# Patient Record
Sex: FEMALE | URBAN - METROPOLITAN AREA
[De-identification: names, ages, dates, MRNs, and addresses within clinical notes are randomized per-mention and may not be internally consistent; named-entity substitution may affect disease eponyms.]

---

## 2020-09-17 ENCOUNTER — APPOINTMENT (OUTPATIENT)
Dept: URBAN - METROPOLITAN AREA CLINIC 254 | Age: 75
Setting detail: DERMATOLOGY
End: 2020-09-21

## 2020-09-17 ENCOUNTER — APPOINTMENT (OUTPATIENT)
Dept: URBAN - METROPOLITAN AREA CLINIC 254 | Age: 75
Setting detail: DERMATOLOGY
End: 2020-09-17

## 2020-09-17 VITALS — WEIGHT: 200 LBS | RESPIRATION RATE: 14 BRPM | HEIGHT: 63.5 IN

## 2020-09-17 DIAGNOSIS — L21.8 OTHER SEBORRHEIC DERMATITIS: ICD-10-CM

## 2020-09-17 DIAGNOSIS — D17 BENIGN LIPOMATOUS NEOPLASM: ICD-10-CM

## 2020-09-17 PROBLEM — D17.0 BENIGN LIPOMATOUS NEOPLASM OF SKIN AND SUBCUTANEOUS TISSUE OF HEAD, FACE AND NECK: Status: ACTIVE | Noted: 2020-09-17

## 2020-09-17 PROCEDURE — OTHER ADDITIONAL NOTES: OTHER

## 2020-09-17 PROCEDURE — OTHER COUNSELING: OTHER

## 2020-09-17 PROCEDURE — 99202 OFFICE O/P NEW SF 15 MIN: CPT

## 2020-09-17 PROCEDURE — OTHER PRESCRIPTION: OTHER

## 2020-09-17 RX ORDER — TRIAMCINOLONE ACETONIDE 0.25 MG/G
0.025% CREAM TOPICAL BID PRN
Qty: 1 | Refills: 0 | Status: ERX | COMMUNITY
Start: 2020-09-17

## 2020-09-17 ASSESSMENT — LOCATION SIMPLE DESCRIPTION DERM
LOCATION SIMPLE: LEFT FOREHEAD
LOCATION SIMPLE: POSTERIOR SCALP

## 2020-09-17 ASSESSMENT — LOCATION ZONE DERM
LOCATION ZONE: SCALP
LOCATION ZONE: FACE

## 2020-09-17 ASSESSMENT — LOCATION DETAILED DESCRIPTION DERM
LOCATION DETAILED: LEFT SUPERIOR FOREHEAD
LOCATION DETAILED: POSTERIOR MID-PARIETAL SCALP

## 2020-09-17 NOTE — PROCEDURE: ADDITIONAL NOTES
Detail Level: Simple
Additional Notes: Patient states lesion has been growing and enlarging. KL will refer patient to Dr. Hunter for excision of lipoma. Wrote down excision and closure codes for patient to check insurance coverage
Additional Notes: Currently has been trying to manage using OTC hydrocortisone and dandruff shampoos. KL recommended patient try a prescription strength topical steroid. Patient agreed.

## 2023-10-14 ENCOUNTER — TELEPHONE (OUTPATIENT)
Dept: GERIATRICS | Facility: CLINIC | Age: 78
End: 2023-10-14
Payer: MEDICARE

## 2023-10-14 NOTE — TELEPHONE ENCOUNTER
Resident new admit after a CABG.  Today requesting O2 due to feeling like she had SOB.    Vitals perfect and O2 sats 94%.  Has edema, legs wrapped and on lasix and elevating them.      Nursing encouraged tramadol to comfort and finally Gauri took it.  Nursing wanting to update about all of this.    Orders:  Due to being a recent CABG patient, expect she may catch her breath.  Ok for 1-2L/min of O2 prn for comfort    DESIRAE Daniel CNP

## 2023-10-15 ENCOUNTER — APPOINTMENT (OUTPATIENT)
Dept: GENERAL RADIOLOGY | Facility: CLINIC | Age: 78
DRG: 280 | End: 2023-10-15
Attending: EMERGENCY MEDICINE
Payer: MEDICARE

## 2023-10-15 ENCOUNTER — HOSPITAL ENCOUNTER (INPATIENT)
Facility: CLINIC | Age: 78
LOS: 3 days | Discharge: NURSING, REHAB OR LONG-TERM CARE | DRG: 280 | End: 2023-10-19
Attending: EMERGENCY MEDICINE | Admitting: INTERNAL MEDICINE
Payer: MEDICARE

## 2023-10-15 DIAGNOSIS — I97.130: ICD-10-CM

## 2023-10-15 DIAGNOSIS — R06.00 DYSPNEA, UNSPECIFIED TYPE: ICD-10-CM

## 2023-10-15 DIAGNOSIS — Z95.1 S/P CABG (CORONARY ARTERY BYPASS GRAFT): ICD-10-CM

## 2023-10-15 DIAGNOSIS — I50.9 ACUTE CONGESTIVE HEART FAILURE, UNSPECIFIED HEART FAILURE TYPE (H): ICD-10-CM

## 2023-10-15 DIAGNOSIS — I50.31 ACUTE HEART FAILURE WITH PRESERVED EJECTION FRACTION (HFPEF) (H): Primary | ICD-10-CM

## 2023-10-15 PROBLEM — S22.000A THORACIC COMPRESSION FRACTURE, CLOSED, INITIAL ENCOUNTER (H): Status: ACTIVE | Noted: 2022-10-09

## 2023-10-15 PROBLEM — M81.0 OSTEOPOROSIS WITHOUT CURRENT PATHOLOGICAL FRACTURE: Status: ACTIVE | Noted: 2023-09-25

## 2023-10-15 PROBLEM — K22.70 BARRETT'S ESOPHAGUS: Status: ACTIVE | Noted: 2019-10-25

## 2023-10-15 PROBLEM — K59.00 CONSTIPATION: Status: ACTIVE | Noted: 2022-10-13

## 2023-10-15 PROBLEM — I10 HTN (HYPERTENSION): Status: ACTIVE | Noted: 2023-08-04

## 2023-10-15 PROBLEM — R79.89 TROPONIN LEVEL ELEVATED: Status: ACTIVE | Noted: 2023-09-29
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
## 2023-10-15 PROBLEM — K58.9 IRRITABLE BOWEL SYNDROME: Status: ACTIVE | Noted: 2023-10-12

## 2023-10-15 PROBLEM — I48.91 ATRIAL FIBRILLATION WITH RAPID VENTRICULAR RESPONSE (H): Status: ACTIVE | Noted: 2023-08-04
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
## 2023-10-15 PROBLEM — I42.2 HYPERTROPHIC CARDIOMYOPATHY (H): Status: ACTIVE | Noted: 2023-08-06

## 2023-10-15 PROBLEM — S42.202D CLOSED FRACTURE OF PROXIMAL END OF LEFT HUMERUS WITH ROUTINE HEALING: Status: ACTIVE | Noted: 2020-02-23

## 2023-10-15 PROBLEM — F43.22 ADJUSTMENT DISORDER WITH ANXIETY: Status: ACTIVE | Noted: 2023-08-04

## 2023-10-15 PROBLEM — S01.01XA SCALP LACERATION, INITIAL ENCOUNTER: Status: ACTIVE | Noted: 2020-04-25

## 2023-10-15 PROBLEM — R07.9 CHEST PAIN, UNSPECIFIED TYPE: Status: ACTIVE | Noted: 2023-09-29

## 2023-10-15 LAB
ANION GAP SERPL CALCULATED.3IONS-SCNC: 14 MMOL/L (ref 7–15)
ATRIAL RATE - MUSE: 62 BPM
BASO+EOS+MONOS # BLD AUTO: ABNORMAL 10*3/UL
BASO+EOS+MONOS NFR BLD AUTO: ABNORMAL %
BASOPHILS # BLD AUTO: 0 10E3/UL (ref 0–0.2)
BASOPHILS NFR BLD AUTO: 0 %
BUN SERPL-MCNC: 6.8 MG/DL (ref 8–23)
CALCIUM SERPL-MCNC: 9.1 MG/DL (ref 8.8–10.2)
CHLORIDE SERPL-SCNC: 91 MMOL/L (ref 98–107)
CREAT SERPL-MCNC: 0.56 MG/DL (ref 0.51–0.95)
DEPRECATED HCO3 PLAS-SCNC: 30 MMOL/L (ref 22–29)
DIASTOLIC BLOOD PRESSURE - MUSE: NORMAL MMHG
EGFRCR SERPLBLD CKD-EPI 2021: >90 ML/MIN/1.73M2
EOSINOPHIL # BLD AUTO: 0.1 10E3/UL (ref 0–0.7)
EOSINOPHIL NFR BLD AUTO: 1 %
ERYTHROCYTE [DISTWIDTH] IN BLOOD BY AUTOMATED COUNT: 14.2 % (ref 10–15)
GLUCOSE SERPL-MCNC: 114 MG/DL (ref 70–99)
HCT VFR BLD AUTO: 35.9 % (ref 35–47)
HGB BLD-MCNC: 12.1 G/DL (ref 11.7–15.7)
HOLD SPECIMEN: NORMAL
HOLD SPECIMEN: NORMAL
IMM GRANULOCYTES # BLD: 0.1 10E3/UL
IMM GRANULOCYTES NFR BLD: 1 %
INTERPRETATION ECG - MUSE: NORMAL
LYMPHOCYTES # BLD AUTO: 2 10E3/UL (ref 0.8–5.3)
LYMPHOCYTES NFR BLD AUTO: 12 %
MAGNESIUM SERPL-MCNC: 1.6 MG/DL (ref 1.7–2.3)
MCH RBC QN AUTO: 31.8 PG (ref 26.5–33)
MCHC RBC AUTO-ENTMCNC: 33.7 G/DL (ref 31.5–36.5)
MCV RBC AUTO: 94 FL (ref 78–100)
MONOCYTES # BLD AUTO: 1 10E3/UL (ref 0–1.3)
MONOCYTES NFR BLD AUTO: 6 %
NEUTROPHILS # BLD AUTO: 13.5 10E3/UL (ref 1.6–8.3)
NEUTROPHILS NFR BLD AUTO: 80 %
NRBC # BLD AUTO: 0 10E3/UL
NRBC BLD AUTO-RTO: 0 /100
NT-PROBNP SERPL-MCNC: 7854 PG/ML (ref 0–1800)
P AXIS - MUSE: 22 DEGREES
PLATELET # BLD AUTO: 409 10E3/UL (ref 150–450)
POTASSIUM SERPL-SCNC: 3.8 MMOL/L (ref 3.4–5.3)
PR INTERVAL - MUSE: 144 MS
QRS DURATION - MUSE: 90 MS
QT - MUSE: 508 MS
QTC - MUSE: 515 MS
R AXIS - MUSE: 11 DEGREES
RBC # BLD AUTO: 3.81 10E6/UL (ref 3.8–5.2)
SODIUM SERPL-SCNC: 135 MMOL/L (ref 135–145)
SYSTOLIC BLOOD PRESSURE - MUSE: NORMAL MMHG
T AXIS - MUSE: 129 DEGREES
TROPONIN T SERPL HS-MCNC: 68 NG/L
TROPONIN T SERPL HS-MCNC: 71 NG/L
VENTRICULAR RATE- MUSE: 62 BPM
WBC # BLD AUTO: 16.7 10E3/UL (ref 4–11)

## 2023-10-15 PROCEDURE — 96374 THER/PROPH/DIAG INJ IV PUSH: CPT

## 2023-10-15 PROCEDURE — 83880 ASSAY OF NATRIURETIC PEPTIDE: CPT | Performed by: EMERGENCY MEDICINE

## 2023-10-15 PROCEDURE — 93005 ELECTROCARDIOGRAM TRACING: CPT

## 2023-10-15 PROCEDURE — G0378 HOSPITAL OBSERVATION PER HR: HCPCS

## 2023-10-15 PROCEDURE — 84484 ASSAY OF TROPONIN QUANT: CPT | Performed by: EMERGENCY MEDICINE

## 2023-10-15 PROCEDURE — 85025 COMPLETE CBC W/AUTO DIFF WBC: CPT | Performed by: EMERGENCY MEDICINE

## 2023-10-15 PROCEDURE — 250N000013 HC RX MED GY IP 250 OP 250 PS 637: Performed by: INTERNAL MEDICINE

## 2023-10-15 PROCEDURE — 83735 ASSAY OF MAGNESIUM: CPT | Performed by: INTERNAL MEDICINE

## 2023-10-15 PROCEDURE — 84132 ASSAY OF SERUM POTASSIUM: CPT | Performed by: INTERNAL MEDICINE

## 2023-10-15 PROCEDURE — 36415 COLL VENOUS BLD VENIPUNCTURE: CPT | Performed by: EMERGENCY MEDICINE

## 2023-10-15 PROCEDURE — 250N000011 HC RX IP 250 OP 636: Mod: JZ | Performed by: EMERGENCY MEDICINE

## 2023-10-15 PROCEDURE — 71046 X-RAY EXAM CHEST 2 VIEWS: CPT

## 2023-10-15 PROCEDURE — 99223 1ST HOSP IP/OBS HIGH 75: CPT | Mod: A1 | Performed by: INTERNAL MEDICINE

## 2023-10-15 PROCEDURE — 36415 COLL VENOUS BLD VENIPUNCTURE: CPT | Performed by: INTERNAL MEDICINE

## 2023-10-15 PROCEDURE — 80048 BASIC METABOLIC PNL TOTAL CA: CPT | Performed by: EMERGENCY MEDICINE

## 2023-10-15 PROCEDURE — 99285 EMERGENCY DEPT VISIT HI MDM: CPT | Mod: 25

## 2023-10-15 RX ORDER — ACETAMINOPHEN 500 MG
1000 TABLET ORAL EVERY 6 HOURS PRN
Status: DISCONTINUED | OUTPATIENT
Start: 2023-10-15 | End: 2023-10-19 | Stop reason: HOSPADM

## 2023-10-15 RX ORDER — ONDANSETRON 4 MG/1
4 TABLET, ORALLY DISINTEGRATING ORAL EVERY 8 HOURS PRN
COMMUNITY

## 2023-10-15 RX ORDER — OMEPRAZOLE 20 MG/1
20 TABLET, DELAYED RELEASE ORAL DAILY
COMMUNITY
End: 2023-10-30

## 2023-10-15 RX ORDER — LISINOPRIL 10 MG/1
10 TABLET ORAL DAILY
Status: DISCONTINUED | OUTPATIENT
Start: 2023-10-16 | End: 2023-10-16

## 2023-10-15 RX ORDER — FUROSEMIDE 10 MG/ML
40 INJECTION INTRAMUSCULAR; INTRAVENOUS ONCE
Status: COMPLETED | OUTPATIENT
Start: 2023-10-15 | End: 2023-10-15

## 2023-10-15 RX ORDER — VITAMIN B COMPLEX
25 TABLET ORAL DAILY
COMMUNITY

## 2023-10-15 RX ORDER — NALOXONE HYDROCHLORIDE 0.4 MG/ML
0.4 INJECTION, SOLUTION INTRAMUSCULAR; INTRAVENOUS; SUBCUTANEOUS
Status: DISCONTINUED | OUTPATIENT
Start: 2023-10-15 | End: 2023-10-19 | Stop reason: HOSPADM

## 2023-10-15 RX ORDER — ONDANSETRON 4 MG/1
4 TABLET, ORALLY DISINTEGRATING ORAL EVERY 6 HOURS PRN
Status: DISCONTINUED | OUTPATIENT
Start: 2023-10-15 | End: 2023-10-15

## 2023-10-15 RX ORDER — FUROSEMIDE 20 MG
20 TABLET ORAL DAILY
Status: ON HOLD | COMMUNITY
End: 2023-10-19

## 2023-10-15 RX ORDER — NALOXONE HYDROCHLORIDE 0.4 MG/ML
0.2 INJECTION, SOLUTION INTRAMUSCULAR; INTRAVENOUS; SUBCUTANEOUS
Status: DISCONTINUED | OUTPATIENT
Start: 2023-10-15 | End: 2023-10-19 | Stop reason: HOSPADM

## 2023-10-15 RX ORDER — TRAMADOL HYDROCHLORIDE 50 MG/1
50 TABLET ORAL EVERY 6 HOURS PRN
Status: ON HOLD | COMMUNITY
End: 2023-10-19

## 2023-10-15 RX ORDER — MULTIPLE VITAMINS W/ MINERALS TAB 9MG-400MCG
1 TAB ORAL DAILY
Status: ON HOLD | COMMUNITY
End: 2023-10-19

## 2023-10-15 RX ORDER — ATORVASTATIN CALCIUM 80 MG/1
80 TABLET, FILM COATED ORAL DAILY
COMMUNITY
End: 2023-11-20

## 2023-10-15 RX ORDER — ASPIRIN 81 MG/1
81 TABLET ORAL DAILY
COMMUNITY

## 2023-10-15 RX ORDER — AMOXICILLIN 250 MG
1 CAPSULE ORAL DAILY PRN
COMMUNITY
End: 2023-11-20

## 2023-10-15 RX ORDER — AMIODARONE HYDROCHLORIDE 200 MG/1
200 TABLET ORAL DAILY
Status: DISCONTINUED | OUTPATIENT
Start: 2023-10-16 | End: 2023-10-19 | Stop reason: HOSPADM

## 2023-10-15 RX ORDER — ONDANSETRON 2 MG/ML
4 INJECTION INTRAMUSCULAR; INTRAVENOUS EVERY 6 HOURS PRN
Status: DISCONTINUED | OUTPATIENT
Start: 2023-10-15 | End: 2023-10-15

## 2023-10-15 RX ORDER — BISACODYL 5 MG
5 TABLET, DELAYED RELEASE (ENTERIC COATED) ORAL DAILY PRN
COMMUNITY
End: 2023-11-20

## 2023-10-15 RX ORDER — TRAMADOL HYDROCHLORIDE 50 MG/1
50 TABLET ORAL EVERY 6 HOURS PRN
Status: DISCONTINUED | OUTPATIENT
Start: 2023-10-15 | End: 2023-10-19 | Stop reason: HOSPADM

## 2023-10-15 RX ORDER — AMIODARONE HYDROCHLORIDE 200 MG/1
200 TABLET ORAL DAILY
COMMUNITY
Start: 2023-10-13 | End: 2023-11-20

## 2023-10-15 RX ORDER — MAGNESIUM OXIDE 400 MG/1
400 TABLET ORAL EVERY 4 HOURS
Status: COMPLETED | OUTPATIENT
Start: 2023-10-15 | End: 2023-10-15

## 2023-10-15 RX ORDER — HYDRALAZINE HYDROCHLORIDE 20 MG/ML
10 INJECTION INTRAMUSCULAR; INTRAVENOUS EVERY 4 HOURS PRN
Status: DISCONTINUED | OUTPATIENT
Start: 2023-10-15 | End: 2023-10-19 | Stop reason: HOSPADM

## 2023-10-15 RX ORDER — POTASSIUM CHLORIDE 750 MG/1
10 TABLET, EXTENDED RELEASE ORAL DAILY
COMMUNITY
End: 2023-10-30

## 2023-10-15 RX ORDER — HYDROXYZINE HYDROCHLORIDE 25 MG/1
25 TABLET, FILM COATED ORAL EVERY 4 HOURS PRN
Status: DISCONTINUED | OUTPATIENT
Start: 2023-10-15 | End: 2023-10-19 | Stop reason: HOSPADM

## 2023-10-15 RX ORDER — LISINOPRIL 5 MG/1
5 TABLET ORAL DAILY
Status: ON HOLD | COMMUNITY
End: 2023-10-19

## 2023-10-15 RX ORDER — HYDROMORPHONE HYDROCHLORIDE 2 MG/1
1 TABLET ORAL EVERY 4 HOURS PRN
Status: ON HOLD | COMMUNITY
End: 2023-10-19

## 2023-10-15 RX ORDER — ASPIRIN 81 MG/1
81 TABLET ORAL DAILY
Status: DISCONTINUED | OUTPATIENT
Start: 2023-10-16 | End: 2023-10-19 | Stop reason: HOSPADM

## 2023-10-15 RX ORDER — ACETAMINOPHEN 500 MG
1000 TABLET ORAL EVERY 6 HOURS PRN
COMMUNITY

## 2023-10-15 RX ORDER — AMLODIPINE BESYLATE 10 MG/1
TABLET ORAL
COMMUNITY
End: 2023-10-15

## 2023-10-15 RX ORDER — HYDROXYZINE HYDROCHLORIDE 25 MG/1
25 TABLET, FILM COATED ORAL EVERY 4 HOURS PRN
Status: ON HOLD | COMMUNITY
End: 2023-10-19

## 2023-10-15 RX ORDER — METOPROLOL SUCCINATE 25 MG/1
25 TABLET, EXTENDED RELEASE ORAL DAILY
COMMUNITY
End: 2023-10-30

## 2023-10-15 RX ORDER — FAMOTIDINE 20 MG/1
20 TABLET, FILM COATED ORAL 2 TIMES DAILY
COMMUNITY
End: 2023-10-27

## 2023-10-15 RX ORDER — POLYETHYLENE GLYCOL 3350 17 G/17G
1 POWDER, FOR SOLUTION ORAL DAILY
COMMUNITY

## 2023-10-15 RX ORDER — LIDOCAINE 40 MG/G
CREAM TOPICAL
Status: DISCONTINUED | OUTPATIENT
Start: 2023-10-15 | End: 2023-10-19 | Stop reason: HOSPADM

## 2023-10-15 RX ORDER — METOPROLOL SUCCINATE 50 MG/1
50 TABLET, EXTENDED RELEASE ORAL DAILY
Status: DISCONTINUED | OUTPATIENT
Start: 2023-10-16 | End: 2023-10-19 | Stop reason: HOSPADM

## 2023-10-15 RX ORDER — PANTOPRAZOLE SODIUM 40 MG/1
40 TABLET, DELAYED RELEASE ORAL DAILY PRN
Status: DISCONTINUED | OUTPATIENT
Start: 2023-10-15 | End: 2023-10-19 | Stop reason: HOSPADM

## 2023-10-15 RX ADMIN — FUROSEMIDE 40 MG: 10 INJECTION, SOLUTION INTRAMUSCULAR; INTRAVENOUS at 14:03

## 2023-10-15 RX ADMIN — Medication 400 MG: at 22:13

## 2023-10-15 RX ADMIN — Medication 400 MG: at 18:24

## 2023-10-15 RX ADMIN — TRAMADOL HYDROCHLORIDE 50 MG: 50 TABLET, COATED ORAL at 18:51

## 2023-10-15 RX ADMIN — APIXABAN 5 MG: 5 TABLET, FILM COATED ORAL at 20:32

## 2023-10-15 ASSESSMENT — ACTIVITIES OF DAILY LIVING (ADL)
ADLS_ACUITY_SCORE: 35
ADLS_ACUITY_SCORE: 40
ADLS_ACUITY_SCORE: 35
ADLS_ACUITY_SCORE: 40
ADLS_ACUITY_SCORE: 40

## 2023-10-15 NOTE — PLAN OF CARE
Orientation/Cognitive: A/Ox4  Mobility Level/Assist Equipment: Ax1-2  Fall Risk (Y/N): yes  Behavior Concerns: none  Pain Management: has chronic back pain, prn tramadol given x1  Tele/VS/O2: tele NSR, SVT for 40 beats and reverted back to NS. MD notified. VSS on RA, HTN  ABNL Lab/BG: Trop 68, BNP 7854, K and Mg replacement. Mg 1.6  Diet: low Na diet  Bowel/Bladder: purwick in place for frequency, no BM  Skin Concerns: midline sternal incision from previous CABG, left thigh scabbing, scattered bruising   Drains/Devices: Left PIV SL  Tests/Procedures for next shift: EKG tomorrow am, SW consult pending  Anticipated DC date & active delays: tbd

## 2023-10-15 NOTE — PHARMACY-ADMISSION MEDICATION HISTORY
Pharmacist Admission Medication History    Admission medication history is complete. The information provided in this note is only as accurate as the sources available at the time of the update.    Information Source(s): Facility ([facility]/) medication list/MAR - [facility]        Changes made to PTA medication list:  Added: Most entries  Deleted: Amlodipine  Changed: None           Allergies reviewed with patient and updates made in EHR: no    Medication History Completed By: Thomas Canseco [facility] 10/15/2023 2:53 PM    PTA Med List   Medication Sig Last Dose    acetaminophen (TYLENOL) 500 MG tablet Take 1,000 mg by mouth every 6 hours as needed for mild pain  at PRN    amiodarone (PACERONE) 200 MG tablet Take 200 mg by mouth daily 10/15/2023 at AM    apixaban ANTICOAGULANT (ELIQUIS) 5 MG tablet Take 5 mg by mouth 2 times daily 10/15/2023 at 0800    aspirin 81 MG EC tablet Take 81 mg by mouth daily 10/15/2023 at 0800    atorvastatin (LIPITOR) 80 MG tablet Take 80 mg by mouth daily 10/15/2023 at 0800    bisacodyl (DULCOLAX) 5 MG EC tablet Take 5 mg by mouth daily as needed for constipation  at PRN    famotidine (PEPCID) 20 MG tablet Take 20 mg by mouth 2 times daily 10/15/2023 at 0800    furosemide (LASIX) 20 MG tablet Take 20 mg by mouth daily 10/15/2023 at 0800    HYDROmorphone (DILAUDID) 2 MG tablet Take 1 mg by mouth every 4 hours as needed for severe pain  at PRN    hydrOXYzine (ATARAX) 25 MG tablet Take 25 mg by mouth every 4 hours as needed for itching (adjuvant pain)  at PRN    lisinopril (ZESTRIL) 5 MG tablet Take 5 mg by mouth daily 10/15/2023 at 0800    metoprolol succinate ER (TOPROL XL) 25 MG 24 hr tablet Take 50 mg by mouth daily 10/15/2023 at 0800    multivitamin w/minerals (THERA-VIT-M) tablet Take 1 tablet by mouth daily 10/15/2023 at 0800    omeprazole (PRILOSEC OTC) 20 MG EC tablet Take 20 mg by mouth daily as needed (symptoms of GERD)  at PRN    ondansetron (ZOFRAN ODT) 4 MG ODT tab Take 4 mg by  mouth every 8 hours as needed for nausea  at PRN    polyethylene glycol (MIRALAX) 17 g packet Take 1 packet by mouth daily 10/15/2023 at 0800    potassium chloride ER (KLOR-CON M) 10 MEQ CR tablet Take 10 mEq by mouth daily 10/15/2023 at 0800    senna-docusate (SENOKOT-S/PERICOLACE) 8.6-50 MG tablet Take 1 tablet by mouth daily as needed for constipation (1-2) at bedtime  at PRN    traMADol (ULTRAM) 50 MG tablet Take 50 mg by mouth every 6 hours as needed for severe pain 10/15/2023 at PRN    vitamin B-Complex Take 1 tablet by mouth daily 10/15/2023 at 0800    Vitamin D3 (CHOLECALCIFEROL) 25 mcg (1000 units) tablet Take 25 mcg by mouth daily 10/15/2023 at 0800

## 2023-10-15 NOTE — ED NOTES
Pt denies chest pressure, SOB, and dizziness at rest. RN instructed pt to notify RN if symptoms return.

## 2023-10-15 NOTE — PROGRESS NOTES
RECEIVING UNIT ED HANDOFF REVIEW    ED Nurse Handoff Report was reviewed by: Malika Castañeda RN on October 15, 2023 at 4:45 PM

## 2023-10-15 NOTE — ED NOTES
Bed: ED30  Expected date:   Expected time:   Means of arrival:   Comments:  [facility] 77 F SOB chest pressure open heart surgery 1.5 weeks ago stable eta 0905

## 2023-10-15 NOTE — PROGRESS NOTES
RECEIVING UNIT ED HANDOFF REVIEW    ED Nurse Handoff Report was reviewed by: Xiomy Winters RN on October 15, 2023 at 2:49 PM

## 2023-10-15 NOTE — ED NOTES
[facility]  ED Nurse Handoff Report    ED Chief complaint: Chest Pressure, Shortness of Breath, and Dizziness      ED Diagnosis:   Final diagnoses:   Dyspnea, unspecified type   Acute congestive heart failure, unspecified heart failure type (H)   Postoperative heart failure following cardiac surgery       Code Status: To be addressed to admitting provider    Allergies: No Known Allergies    Patient Story: Patient presents s/p triple bypass 10/4 with SOB and dizziness    Focused Assessment:    XR Chest 2 Views   Final Result   IMPRESSION: Low lung volumes with small bilateral pleural effusions and associated airspace opacities. Pulmonary vascular crowding with suggestion of mild interstitial pulmonary edema.      Unchanged enlarged cardiomediastinal silhouette with sternotomy wires.      Degenerative changes in the shoulders with old fracture deformity of the left humerus.        Labs Ordered and Resulted from Time of ED Arrival to Time of ED Departure   BASIC METABOLIC PANEL - Abnormal       Result Value    Sodium 135      Potassium 3.8      Chloride 91 (*)     Carbon Dioxide (CO2) 30 (*)     Anion Gap 14      Urea Nitrogen 6.8 (*)     Creatinine 0.56      GFR Estimate >90      Calcium 9.1      Glucose 114 (*)    TROPONIN T, HIGH SENSITIVITY - Abnormal    Troponin T, High Sensitivity 71 (*)    CBC WITH PLATELETS AND DIFFERENTIAL - Abnormal    WBC Count 16.7 (*)     RBC Count 3.81      Hemoglobin 12.1      Hematocrit 35.9      MCV 94      MCH 31.8      MCHC 33.7      RDW 14.2      Platelet Count 409      % Neutrophils 80      % Lymphocytes 12      % Monocytes 6      Mids % (Monos, Eos, Basos)        % Eosinophils 1      % Basophils 0      % Immature Granulocytes 1      NRBCs per 100 WBC 0      Absolute Neutrophils 13.5 (*)     Absolute Lymphocytes 2.0      Absolute Monocytes 1.0      Mids Abs (Monos, Eos, Basos)        Absolute Eosinophils 0.1      Absolute Basophils 0.0      Absolute Immature  Granulocytes 0.1      Absolute NRBCs 0.0     NT PROBNP INPATIENT - Abnormal    N terminal Pro BNP Inpatient 7,854 (*)    TROPONIN T, HIGH SENSITIVITY - Abnormal    Troponin T, High Sensitivity 68 (*)      Treatments and/or interventions provided:   Medications   furosemide (LASIX) injection 40 mg (40 mg Intravenous $Given 10/15/23 5575)     Does this patient have any cognitive concerns?: None. Alert and oriented x4.     Activity level - Baseline/Home:  Independent  Activity Level - Current:   Unknown    Patient's Preferred language: English   Needed?: No    Isolation: None  Infection: Not Applicable  Patient tested for COVID 19 prior to admission: NO  Bariatric?: No    Vital Signs:   Vitals:    10/15/23 1330 10/15/23 1400 10/15/23 1430 10/15/23 1530   BP: (!) 169/52 (!) 174/87 (!) 170/72 (!) 155/71   Pulse: 61 62 62 57   Resp:   20 28   Temp:       TempSrc:       SpO2:   93% 94%   Weight:       Height:           Cardiac Rhythm:Cardiac Rhythm: Normal sinus rhythm    ED NURSE PHONE NUMBER: *[id number]

## 2023-10-15 NOTE — ED TRIAGE NOTES
Pt EMS from [place], with reports of SOB, chest pressure (around chest incision from Triple bypass 10/4) and dizziness. Pt denies pain at rest. 3L O2 via NC, -180s mmHg. .

## 2023-10-15 NOTE — ED PROVIDER NOTES
History     Chief Complaint:  Chest Pressure, Shortness of Breath, and Dizziness       HPI   Maribel Buenrostro is a 77 year old female presents to the ER at 0911 on Sunday morning via EMS from [facility] where she is undergoing rehab.  The patient was just discharged 2 days ago from [facility] on Friday (10/13) after she underwent a three-vessel bypass at [facility] on 10/4/2023.  The patient intentionally came to [facility] today rather than going back to [facility] because she feels she was discharged from [facility] too early.  She also feels that the staff at the [facility] are not able to care for her properly.  She feels that she is being asked to do more than she should to care for herself after a major surgery such as this.  Patient complains of anterior chest pain that radiates up into her anterior neck.  This is not new for her.  Patient also noted some minor bleeding coming from the anterior sternal surgical wound.  Her main complaint is increasing shortness of breath and dyspnea on exertion.    Independent Historian:   History was obtained from the patient and EMS notes.    Review of External Notes:   I reviewed the [facility] discharge summary from 10/13/2023         [facility]               HOSPITALIST DIVISION  -----------------------------------------------------  Discharge Summary           Patient Name: Maribel Buenrostro  YOB: 1945   Medical Record Number: [id number]   Attending Provider: -Hospitalist   Admission Date: 9/29/2023  Discharge Date: 10/13/2023           HOSPITAL COURSE:  Maribel Buenrostro is a 77 y.o. female with PMH of alcohol use disorder, HTN, afib on eliquis, Cox's esophagus, IBS, also found to have hypertrophic cardiomyopathy on cardiac MRI admitted for A-fib RVR.  after presenting with dizziness. Found to have NSTEMI and underwent CABGx3 for multivessel CAD.  For more detail,  please see admission H&P.  Hospital course dictated by problem:        Multivessel CAD with NSTEMI s/p CABG x3 on 10/4  Presented w/ dizziness found to be in afib w/ RVR. Trop elevation with continued rise and [place] at [address]. She was chest pain free but had shoulder discomfort that radiated to jaw - thought to be her anginal equivalent. She was heparinzed in preparation for angiogram. Angiogram showed multivessel CAD including left main not amenable to PCI. She underwent CABG x3 on 10/4 with post-op cares in ICU. Chest tubes removed. Weaned off insulin gtt and pressors.  Cleared for DC from surgery standpoint.   - cont PO lasix 20mg with 10mEq K-dur and bmp check at TCU  - cont PO amio  - cont ASA/statin, lisinopril and toprol XL  - [facility] 3-4 weeks        Symptomatic Atrial fibrillation w/ RVR - resolved  Admit in August of 2023 at [facility] for new diagnosis of afib with RVR. Underwent cardioversion at that time and started on eliquis.   Post-op CABG required dilt gtt for rate control. Subsequently weaned and stopped  - cont metoprolol and PO amiodarone 200mg daily, cont at DC  - cont eliquis  - Per cardiology: Recommend baseline PFTs at discharge.  For amiodarone monitoring she will require 6 monthly TFTs/LFTs and yearly PFTs.      Apical LVH / hypertrophic cardiomyopathy  Findings noted on outpt cardiac MRI. Has f/u planned at [facility] cardiology - Cardiology recs family screening  - outpt f/u with [facility] cardiology     Alcohol use disorder  Drinks 2-3 shots of hard liquor daily per report. Las tdrink was 9/29 PTA. Well outside window of withdrawal     Leukocytosis  17.6 > 13. Likely stress response 2/2 CABG. Cx taken 10/6 and NGTD. No e/o infectious process at this time     Loose stools  Hx of IBS per chart. Bowel meds held and now only prn        DISCHARGE DIAGNOSES:   Principal Problem:    Atrial fibrillation with rapid ventricular response (HCC)  Active Problems:    Adjustment disorder with  anxiety    Chest pain, unspecified type    Troponin level elevated    S/P CABG (coronary artery bypass graft)                Medications:    From recent discharge summary  NEW MEDICATIONS     Details   amiodarone (CORDARONE) 200 mg oral tablet Take 1 tablet (200 mg) by mouth once daily.  Refills: 0     Associated Diagnoses: S/P CABG (coronary artery bypass graft)       aspirin 81 mg oral enteric coated tablet Take 1 tablet (81 mg) by mouth once daily.  Refills: 0     Associated Diagnoses: S/P CABG (coronary artery bypass graft)       atorvastatin (LIPITOR) 80 mg oral tablet Take 1 tablet (80 mg) by mouth every morning.  Refills: 0     Associated Diagnoses: S/P CABG (coronary artery bypass graft)       famotidine (PEPCID) 20 mg oral tablet Take 1 tablet (20 mg) by mouth twice a day Indications: stress ulcer prophylaxis.  Refills: 0     Associated Diagnoses: S/P CABG (coronary artery bypass graft)       furosemide (LASIX) 20 mg oral tablet Take 1 tablet (20 mg) by mouth once daily.  Refills: 0     Associated Diagnoses: S/P CABG (coronary artery bypass graft)       hydrOXYzine pamoate (VISTARIL) 25 mg oral capsule Take 1 capsule (25 mg) by mouth every 4 (four) hours as needed.  Refills: 0     Associated Diagnoses: S/P CABG (coronary artery bypass graft)       lisinopriL (PRINIVIL) 5 mg oral tablet Take 1 tablet (5 mg) by mouth once daily.  Refills: 0     Associated Diagnoses: S/P CABG (coronary artery bypass graft)       metoprolol succinate, XL, (TOPROL XL) 25 mg oral extended release tablet 24 HR Take 2 tablets (50 mg) by mouth once daily.  Qty: 90 tablet, Refills: 0     Associated Diagnoses: S/P CABG (coronary artery bypass graft)       ondansetron (ZOFRAN) 4 mg oral ODT Dissolve 1-2 tablets (4-8 mg) in mouth every 8 (eight) hours as needed.  Refills: 0     Associated Diagnoses: S/P CABG (coronary artery bypass graft)       potassium chloride (K-DUR) 10 mEq oral extended release tablet Take 1 tablet (10 mEq) by  mouth once daily.  Refills: 0     Associated Diagnoses: S/P CABG (coronary artery bypass graft)       senna-docusate (SENNA-S) 8.6-50 mg oral tablet Take 1-2 tablets by mouth at bedtime as needed.  Refills: 0     Associated Diagnoses: S/P CABG (coronary artery bypass graft)                   MEDICATIONS CONTINUED WITH CHANGES     Details   acetaminophen (TYLENOL) 500 mg oral tablet Take 2 tablets (1,000 mg) by mouth every 6 (six) hours as needed for pain or fever.     Associated Diagnoses: S/P CABG (coronary artery bypass graft)       traMADoL (ULTRAM) 50 mg oral tablet Take 1 tablet (50 mg) by mouth every 6 (six) hours as needed for pain.  Qty: 12 tablet, Refills: 0     Associated Diagnoses: S/P CABG (coronary artery bypass graft)                  MEDICATIONS CONTINUED UNCHANGED     Details   apixaban (ELIQUIS) 5 mg oral tablet Take 1 tablet (5 mg) by mouth twice a day.       bisacodyl (DULCOLAX) 5 mg oral delayed released tablet Take 1-3 tablets (5-15 mg) by mouth once a day as needed for constipation.  Refills: 0       cholecalciferol, vitamin D3, 25 mcg, 1000 unit, 25 mcg (1,000 unit) oral tablet Take 1 tablet (25 mcg) by mouth once daily. Unknown strength       Multivitamins oral chew tab Chew 1 tablet once daily.       omeprazole magnesium (PRILOSEC OTC) 20 mg oral delayed release tablet Take 1 tablet (20 mg) by mouth once a day as needed.       polyethylene glycol (MIRALAX) 17 gram oral packet Take 17 g by mouth once daily. Mix each dose in 4-8 ounces of liquid as directed.  Refills: 0       VITAMIN B COMPLEX NO.12-NIACIN ORAL Take 1 Tab by mouth Once Daily.           Past Medical History:    A-fib with RVR  Chest pain  Adjustment disorder  Anxiety  Alcohol use disorder  Hypertrophic cardiomyopathy  Multivessel coronary artery disease    Patient Active Problem List   Diagnosis    Adjustment disorder with anxiety    Atrial fibrillation with rapid ventricular response (H)    Cox's esophagus    Chest pain,  "unspecified type    CHF (congestive heart failure) (H)    Closed fracture of proximal end of left humerus with routine healing    Constipation    Fall    Fall from slipping on ice    HTN (hypertension)    Hypertrophic cardiomyopathy (H)    Irritable bowel syndrome    Osteoporosis without current pathological fracture    S/P CABG (coronary artery bypass graft)    Scalp laceration, initial encounter    Thoracic compression fracture, closed, initial encounter (H)    Troponin level elevated         Past Surgical History:    Status post CABG x3 vessels    Physical Exam   Patient Vitals for the past 24 hrs:   BP Temp Temp src Pulse Resp SpO2 Height Weight   10/15/23 0945 (!) 146/77 -- -- 60 21 92 % -- --   10/15/23 0920 (!) 182/96 98.4  F (36.9  C) Oral 64 28 94 % 1.575 m (5' 2\") 86.2 kg (190 lb)        Physical Exam  General: Alert, No distress. Nontoxic appearance  Head: No signs of trauma.   Mouth/Throat: Oropharynx moist.   Eyes: Conjunctivae are normal. Pupils are equal..   Neck: Normal range of motion.    CV: Appears well perfused.  Regular rate and rhythm with no murmurs  Resp:No respiratory distress.  Fine crackles bilaterally  MSK: Normal range of motion. No obvious deformity.  Bilateral lower extremity edema  Neuro: The patient is alert and interactive. [place]. Speech normal. GCS 15  Skin: No lesion or sign of trauma noted.  Anterior chest wall healing incision.  Psych: normal mood and affect. behavior is normal.  The patient is upset about her current situation but denies active suicidal ideation here.      Emergency Department Course   ECG  ECG results from 10/15/23   EKG 12-lead, tracing only     Value    Systolic Blood Pressure     Diastolic Blood Pressure     Ventricular Rate 62    Atrial Rate 62    VA Interval 144    QRS Duration 90        QTc 515    P Axis 22    R AXIS 11    T Axis 129    Interpretation ECG      Sinus rhythm  ST & T wave abnormality, consider anterolateral ischemia  Prolonged " QT  Abnormal ECG  No previous ECGs available            Imaging:  XR Chest 2 Views   Final Result   IMPRESSION: Low lung volumes with small bilateral pleural effusions and associated airspace opacities. Pulmonary vascular crowding with suggestion of mild interstitial pulmonary edema.      Unchanged enlarged cardiomediastinal silhouette with sternotomy wires.      Degenerative changes in the shoulders with old fracture deformity of the left humerus.             Laboratory:  Labs Ordered and Resulted from Time of ED Arrival to Time of ED Departure   BASIC METABOLIC PANEL - Abnormal       Result Value    Sodium 135      Potassium 3.8      Chloride 91 (*)     Carbon Dioxide (CO2) 30 (*)     Anion Gap 14      Urea Nitrogen 6.8 (*)     Creatinine 0.56      GFR Estimate >90      Calcium 9.1      Glucose 114 (*)    TROPONIN T, HIGH SENSITIVITY - Abnormal    Troponin T, High Sensitivity 71 (*)    CBC WITH PLATELETS AND DIFFERENTIAL - Abnormal    WBC Count 16.7 (*)     RBC Count 3.81      Hemoglobin 12.1      Hematocrit 35.9      MCV 94      MCH 31.8      MCHC 33.7      RDW 14.2      Platelet Count 409      % Neutrophils 80      % Lymphocytes 12      % Monocytes 6      Mids % (Monos, Eos, Basos)        % Eosinophils 1      % Basophils 0      % Immature Granulocytes 1      NRBCs per 100 WBC 0      Absolute Neutrophils 13.5 (*)     Absolute Lymphocytes 2.0      Absolute Monocytes 1.0      Mids Abs (Monos, Eos, Basos)        Absolute Eosinophils 0.1      Absolute Basophils 0.0      Absolute Immature Granulocytes 0.1      Absolute NRBCs 0.0     NT PROBNP INPATIENT - Abnormal    N terminal Pro BNP Inpatient 7,854 (*)    TROPONIN T, HIGH SENSITIVITY - Abnormal    Troponin T, High Sensitivity 68 (*)         Emergency Department Course & Assessments:        Interventions:  Medications   acetaminophen (TYLENOL) tablet 1,000 mg (has no administration in time range)   amiodarone (PACERONE) tablet 200 mg (has no administration in time  range)   apixaban ANTICOAGULANT (ELIQUIS) tablet 5 mg (5 mg Oral $Given 10/15/23 2032)   aspirin EC tablet 81 mg (has no administration in time range)   hydrOXYzine (ATARAX) tablet 25 mg (has no administration in time range)   lisinopril (ZESTRIL) tablet 10 mg (has no administration in time range)   metoprolol succinate ER (TOPROL XL) 24 hr tablet 50 mg (has no administration in time range)   pantoprazole (PROTONIX) EC tablet 40 mg (has no administration in time range)   traMADol (ULTRAM) tablet 50 mg (50 mg Oral $Given 10/15/23 1851)   lidocaine 1 % 0.1-1 mL (has no administration in time range)   lidocaine (LMX4) cream (has no administration in time range)   sodium chloride (PF) 0.9% PF flush 3 mL (has no administration in time range)   sodium chloride (PF) 0.9% PF flush 3 mL (3 mLs Intracatheter $Given 10/15/23 1723)   naloxone (NARCAN) injection 0.2 mg (has no administration in time range)     Or   naloxone (NARCAN) injection 0.4 mg (has no administration in time range)     Or   naloxone (NARCAN) injection 0.2 mg (has no administration in time range)     Or   naloxone (NARCAN) injection 0.4 mg (has no administration in time range)   hydrALAZINE (APRESOLINE) injection 10 mg (has no administration in time range)   magnesium oxide (MAG-OX) tablet 400 mg (400 mg Oral $Given 10/15/23 1824)   furosemide (LASIX) injection 40 mg (40 mg Intravenous $Given 10/15/23 1403)        Assessments:  Patient was assessed upon arrival in the ED and several times thereafter    Independent Interpretation (X-rays, CTs, rhythm strip):  Chest x-ray shows no evidence of pneumothorax.    Consultations/Discussion of Management or Tests:  I consulted with the admitting hospitalist       Social Determinants of Health affecting care:   Healthcare Access/Compliance, Homelessness/Housing Insecurity, and Stress/Adjustment Disorders    Disposition:  The patient was admitted to the hospital under the care of Dr. Bhatia.     Impression & Plan         Medical Decision Making:  The differential considered for the dyspnea included CHF, PE, ACS, pneumonia, pneumothorax, medication induced pulmonary toxicity, ARDS, metabolic acidosis, airflow obstruction (asthma, COPD, upper airway obstruction), restrictive lung disease (interstitial lung disease, pleural thickening or effusion, respiratory muscle weakness, obesity), aspiration, cardiac arrhythmia, cardiac tamponade, hypercapnia, sepsis, and anemia.  High-sensitivity troponin is elevated but stable.  No indication of acute coronary syndrome.  The etiology of these symptoms is likely secondary to increased vascular congestion.  The patient was given IV Lasix.  She recently had surgery through [facility].  I attempted to transfer the patient to [facility] but they have no cardiac beds available.  Therefore the patient will be admitted to the hospital here at [facility] under observation status.        Diagnosis:    ICD-10-CM    1. Dyspnea, unspecified type  R06.00       2. Acute congestive heart failure, unspecified heart failure type (H)  I50.9       3. Postoperative heart failure following cardiac surgery  I97.130              10/15/2023   Richie Mitchell MD Joing, Todd Roger, MD  10/15/23 2133

## 2023-10-15 NOTE — PROVIDER NOTIFICATION
MD Notification    Notified Person: MD    Notified Person Name: Dr. Bhatia    Notification Date/Time: 1745, 10/15/23    Notification Interaction: Page    Purpose of Notification: FYI BP is 174/83, will let pt to settle into new room and will retake. Pt is asymptomatic    Orders Received:    Comments:

## 2023-10-15 NOTE — PROVIDER NOTIFICATION
MD Notification    Notified Person: MD    Notified Person Name:  Javier    Notification Date/Time: 18:18, 10/15/23    Notification Interaction: Page    Purpose of Notification: FYI pt had a run of SVT for 40 beats, now has reverted back to Normal sinus. Please advise. Pt asymptomatic.    Orders Received: monitor for now    Comments:

## 2023-10-15 NOTE — H&P
[facility]    History and Physical - Hospitalist Service       Date of Admission:  10/15/2023    Assessment & Plan   Maribel Buenrostro is a 77 year old female who was just hospitalized at [facility] (9/29-10/13/23) for NSTEMI requiring 3-v CABG on 10/4 who presented to the ED at [facility] from TCU on 10/15/2023 for evaluation of shortness of breath, and was found to have a mild CHF exacerbation. Attempt to transfer to [facility] from the ED was unsuccessful due to lack of cardiac beds    Acute on chronic diastolic CHF due to possible hypertensive emergency  Recent NSTEMI s/p 3-v CABG (LIMA to LAD, SVG to ramus, SVG to diag) on 10/4/23  Elevated troponin due to recent NSTEMI  Hypertrophic cardiomyopathy  *9/29-10/13/23: Hospitalized at [facility] for evaluation of dizziness, and was found to be in a-fib/RVR. Subsequently found to have NSTEMI and ultimately underwent 3-v CABG on 10/4. At the time of discharge, she did not feel medically ready, but because vital signs were stable, she was discharged to TCU  *Meds at discharge: ASA 81 mg daily, metoprolol ER 50 mg daily, lisinopril 5 mg daily, atorvastatin 80 mg daily  *Presented on this admission (10/15) with acute shortness of breath following therapy. Hypertensive to 180s systolic on arrival; other VSS on RA. Trop 71, but was elevated to >3300 during her recent hospitalization; repeat trop 68. NTpBNP>7000 and CXR showed pulmonary edema. Recent TTE (10/6) showed EF 64%, stable severe apical LVH   *In the ED, she was initiated on IV Lasix  - Currently appears largely comfortable on room air; breathing more labored after a long sentence  - Will repeat Lasix 40 mg IV x1 this evening; reassess in AM  - Increase PTA lisinopril from 5 to 10 mg daily  - Cont ASA, metoprolol, and atorvastatin as previously prescribed    Prolonged QTc  QTc 508 ms on arrival  - High K and Mg protocol ordered  - Monitor on telemetry  - Repeat EKG tomorrow AM  (ordered)    Leukocytosis   WBC 16.7. She has no localizing symptoms of infection. Suspect stress demargination from recent surgery. Last WBC 17.5 on 10/11  - Repeat CBC in AM    Paroxysmal atrial fibrillation  *Diagnosed 8/2023 at [facility]; underwent successful DCCV on 8/7  *PTA regimen: metoprolol ER 50 mg daily, amiodarone 200 mg daily, apixaban 5 mg BID  - In sinus rhythm on admission  - Cont PTA meds    Cox's esophagus  IBS  Chronic and stable on PPI prn    Alcohol use disorder  Has not had alcohol since before her recent hospitalization    Clinically Significant Risk Factors Present on Admission   # Obesity: Estimated body mass index is 34.75 kg/m      Diet: 2 gram sodium diet  DVT Prophylaxis: Pneumatic Compression Devices  Navarro Catheter: Not present  Code Status:  DNR/DNI       Disposition: Expected discharge back to [facility] TCU tomorrow if shortness of breath improved after ambulation    Loraine Bhatia MD  [facility]  Contact information available via [facility] Paging/Directory    ______________________________________________________________________    Chief Complaint   Shortness of breath    History is obtained from the patient, discussion with ED staff, and review of medical records    History of Present Illness   Maribel Buenrostro is a 77 year old female with hx of paroxysmal a-fib on chronic AC with apixaban, hypertrophic cardiomyopathy, and alcohol use disorder who presents from [facility] TCU with acute shortness of breath following a therapy session. The patient was just discharged from [facility] just 2 days ago after being treated for a-fib/RVR and NSTEMI requiring 3-v CABG on 10/4. Patient did not feel medically ready at the time of discharge - she is unable to describe her symptoms other than that she did not feel well enough - but because her vital signs and blood work were otherwise stable, she was discharged to TCU. During therapy today,  she initially felt somewhat weak and lightheaded, but after sitting down, she suddenly felt like she couldn't breathe. EMS was activated at that time    On arrival, BP was elevated to 180s/90s; other VSS on RA. NTpBNP>7000 and CXR showed pulmonary edema. Recent TTE (10/6) showed EF 64%, stable severe apical LVH. She has been given a dose of Lasix 40 mg IV x1.     Her shortness of breath has improved, and she offers no other complaints. BLE edema compared to her recent hospitalization is improved. Post-op pain is improving. She denies chest pain; reports intermittent dizziness which is mild. She believes her weakness/deconditioning is improving.     Review of Systems    10 point Review of Systems was reviewed and pertinent positives and negatives are noted in the HPI    Past Medical History    I have reviewed this patient's medical history and updated it with pertinent information if needed.   Past Medical History:   Diagnosis Date    Cox's esophagus     CAD (coronary artery disease)     s/p 3-v CABG (LIMA to LAD, SVG to ramus, SVG to diag) on 10/4/23    Hypertrophic cardiomyopathy (H)     Irritable bowel syndrome     Paroxysmal atrial fibrillation (H)        Past Surgical History   I have reviewed this patient's surgical history and updated it with pertinent information if needed.  Past Surgical History:   Procedure Laterality Date    AR CABG, ARTERY-VEIN, THREE         Social History   Remote history of smoking. Has not consumed alcohol since her hospitalization at [facility].     Family History   Family history was reviewed and non-contributory    Prior to Admission Medications   Prior to Admission Medications   Prescriptions Last Dose Informant Patient Reported? Taking?   amLODIPine (NORVASC) 10 MG tablet   Yes No   Sig: amlodipine 10 mg tablet   Take 10 mg by mouth once daily.   amiodarone (PACERONE) 200 MG tablet   Yes Yes   Sig: Take 200 mg by mouth   apixaban ANTICOAGULANT (ELIQUIS) 5 MG tablet   Yes  Yes   Sig: Take 5 mg by mouth      Facility-Administered Medications: None     Allergies   No Known Allergies    Physical Exam   Vital Signs: Temp: 98.4  F (36.9  C) Temp src: Oral BP: (!) 167/68 Pulse: 59   Resp: 20 SpO2: 94 % O2 Device: None (Room air)    Weight: 190 lbs 0 oz    Constitutional: Resting in bed, NAD  HEENT: Wig intact. Sclera white, conjunctiva clear, EOMI, MMM  Respiratory: Breathing minimally labored. Diminished breath sounds bilaterally  Cardiovascular: Heart RRR, no m/r/g. Sternal dressing intact. 1+ BLE edema   GI: +BS. Abd soft/NT  Lymph/Hematologic: No cervical LAD  Genitourinary: Not examined  Skin: Healing incisions and ecchymoses on LLE  Musculoskeletal: Normal muscle bulk and tone  Neurologic: Alert and appropriate. [place]UR[place]  Psychiatric: Calm and cooperative; anxious at times    Data   Data reviewed today: I reviewed all medications, new labs and imaging results over the last 24 hours. I personally reviewed the EKG tracing showing sinus rhythm and the chest x-ray image(s) showing pulmonary edema .    Recent Labs   Lab 10/15/23  0944   WBC 16.7*   HGB 12.1   MCV 94         POTASSIUM 3.8   CHLORIDE 91*   CO2 30*   BUN 6.8*   CR 0.56   ANIONGAP 14   JUAN 9.1   *         Recent Results (from the past 24 hour(s))   XR Chest 2 Views    Narrative    EXAM: XR CHEST 2 VIEWS  LOCATION: [facility]  DATE: 10/15/2023    INDICATION: sob  COMPARISON: 8/4/2023      Impression    IMPRESSION: Low lung volumes with small bilateral pleural effusions and associated airspace opacities. Pulmonary vascular crowding with suggestion of mild interstitial pulmonary edema.    Unchanged enlarged cardiomediastinal silhouette with sternotomy wires.    Degenerative changes in the shoulders with old fracture deformity of the left humerus.

## 2023-10-16 LAB
ANION GAP SERPL CALCULATED.3IONS-SCNC: 11 MMOL/L (ref 7–15)
BUN SERPL-MCNC: 10.1 MG/DL (ref 8–23)
CALCIUM SERPL-MCNC: 9.1 MG/DL (ref 8.8–10.2)
CHLORIDE SERPL-SCNC: 93 MMOL/L (ref 98–107)
CREAT SERPL-MCNC: 0.57 MG/DL (ref 0.51–0.95)
DEPRECATED HCO3 PLAS-SCNC: 30 MMOL/L (ref 22–29)
EGFRCR SERPLBLD CKD-EPI 2021: >90 ML/MIN/1.73M2
ERYTHROCYTE [DISTWIDTH] IN BLOOD BY AUTOMATED COUNT: 14.2 % (ref 10–15)
GLUCOSE SERPL-MCNC: 108 MG/DL (ref 70–99)
HCT VFR BLD AUTO: 34.8 % (ref 35–47)
HGB BLD-MCNC: 11.8 G/DL (ref 11.7–15.7)
MAGNESIUM SERPL-MCNC: 1.8 MG/DL (ref 1.7–2.3)
MCH RBC QN AUTO: 31.5 PG (ref 26.5–33)
MCHC RBC AUTO-ENTMCNC: 33.9 G/DL (ref 31.5–36.5)
MCV RBC AUTO: 93 FL (ref 78–100)
PLATELET # BLD AUTO: 387 10E3/UL (ref 150–450)
POTASSIUM SERPL-SCNC: 3.4 MMOL/L (ref 3.4–5.3)
POTASSIUM SERPL-SCNC: 3.7 MMOL/L (ref 3.4–5.3)
RBC # BLD AUTO: 3.75 10E6/UL (ref 3.8–5.2)
SODIUM SERPL-SCNC: 134 MMOL/L (ref 135–145)
WBC # BLD AUTO: 16.8 10E3/UL (ref 4–11)

## 2023-10-16 PROCEDURE — 85027 COMPLETE CBC AUTOMATED: CPT | Performed by: INTERNAL MEDICINE

## 2023-10-16 PROCEDURE — 83735 ASSAY OF MAGNESIUM: CPT | Performed by: INTERNAL MEDICINE

## 2023-10-16 PROCEDURE — G0378 HOSPITAL OBSERVATION PER HR: HCPCS

## 2023-10-16 PROCEDURE — 250N000013 HC RX MED GY IP 250 OP 250 PS 637: Performed by: INTERNAL MEDICINE

## 2023-10-16 PROCEDURE — 93005 ELECTROCARDIOGRAM TRACING: CPT

## 2023-10-16 PROCEDURE — 93010 ELECTROCARDIOGRAM REPORT: CPT | Performed by: INTERNAL MEDICINE

## 2023-10-16 PROCEDURE — 96376 TX/PRO/DX INJ SAME DRUG ADON: CPT

## 2023-10-16 PROCEDURE — 80048 BASIC METABOLIC PNL TOTAL CA: CPT | Performed by: INTERNAL MEDICINE

## 2023-10-16 PROCEDURE — 250N000011 HC RX IP 250 OP 636: Mod: JZ | Performed by: STUDENT IN AN ORGANIZED HEALTH CARE EDUCATION/TRAINING PROGRAM

## 2023-10-16 PROCEDURE — 120N000001 HC R&B MED SURG/OB

## 2023-10-16 PROCEDURE — 250N000011 HC RX IP 250 OP 636: Mod: JZ | Performed by: INTERNAL MEDICINE

## 2023-10-16 PROCEDURE — 99233 SBSQ HOSP IP/OBS HIGH 50: CPT | Performed by: STUDENT IN AN ORGANIZED HEALTH CARE EDUCATION/TRAINING PROGRAM

## 2023-10-16 PROCEDURE — 36415 COLL VENOUS BLD VENIPUNCTURE: CPT | Performed by: INTERNAL MEDICINE

## 2023-10-16 PROCEDURE — 250N000013 HC RX MED GY IP 250 OP 250 PS 637: Performed by: STUDENT IN AN ORGANIZED HEALTH CARE EDUCATION/TRAINING PROGRAM

## 2023-10-16 PROCEDURE — 99223 1ST HOSP IP/OBS HIGH 75: CPT | Performed by: INTERNAL MEDICINE

## 2023-10-16 RX ORDER — FUROSEMIDE 10 MG/ML
60 INJECTION INTRAMUSCULAR; INTRAVENOUS EVERY 12 HOURS
Status: COMPLETED | OUTPATIENT
Start: 2023-10-16 | End: 2023-10-17

## 2023-10-16 RX ORDER — LISINOPRIL 20 MG/1
20 TABLET ORAL DAILY
Status: DISCONTINUED | OUTPATIENT
Start: 2023-10-17 | End: 2023-10-19 | Stop reason: HOSPADM

## 2023-10-16 RX ORDER — AMLODIPINE BESYLATE 5 MG/1
5 TABLET ORAL DAILY
Status: DISCONTINUED | OUTPATIENT
Start: 2023-10-16 | End: 2023-10-19 | Stop reason: HOSPADM

## 2023-10-16 RX ORDER — POTASSIUM CHLORIDE 1500 MG/1
20 TABLET, EXTENDED RELEASE ORAL ONCE
Status: COMPLETED | OUTPATIENT
Start: 2023-10-16 | End: 2023-10-16

## 2023-10-16 RX ORDER — LISINOPRIL 10 MG/1
10 TABLET ORAL ONCE
Status: COMPLETED | OUTPATIENT
Start: 2023-10-16 | End: 2023-10-16

## 2023-10-16 RX ORDER — FUROSEMIDE 10 MG/ML
40 INJECTION INTRAMUSCULAR; INTRAVENOUS ONCE
Status: COMPLETED | OUTPATIENT
Start: 2023-10-16 | End: 2023-10-16

## 2023-10-16 RX ORDER — MAGNESIUM OXIDE 400 MG/1
400 TABLET ORAL EVERY 4 HOURS
Status: COMPLETED | OUTPATIENT
Start: 2023-10-16 | End: 2023-10-16

## 2023-10-16 RX ADMIN — LISINOPRIL 10 MG: 10 TABLET ORAL at 08:08

## 2023-10-16 RX ADMIN — FUROSEMIDE 40 MG: 10 INJECTION, SOLUTION INTRAMUSCULAR; INTRAVENOUS at 13:17

## 2023-10-16 RX ADMIN — APIXABAN 5 MG: 5 TABLET, FILM COATED ORAL at 08:08

## 2023-10-16 RX ADMIN — LISINOPRIL 10 MG: 10 TABLET ORAL at 18:35

## 2023-10-16 RX ADMIN — TRAMADOL HYDROCHLORIDE 50 MG: 50 TABLET, COATED ORAL at 21:44

## 2023-10-16 RX ADMIN — POTASSIUM CHLORIDE 20 MEQ: 1500 TABLET, EXTENDED RELEASE ORAL at 08:08

## 2023-10-16 RX ADMIN — FUROSEMIDE 60 MG: 10 INJECTION, SOLUTION INTRAMUSCULAR; INTRAVENOUS at 18:35

## 2023-10-16 RX ADMIN — TRAMADOL HYDROCHLORIDE 50 MG: 50 TABLET, COATED ORAL at 02:50

## 2023-10-16 RX ADMIN — AMLODIPINE BESYLATE 5 MG: 5 TABLET ORAL at 18:35

## 2023-10-16 RX ADMIN — Medication 400 MG: at 08:07

## 2023-10-16 RX ADMIN — APIXABAN 5 MG: 5 TABLET, FILM COATED ORAL at 21:38

## 2023-10-16 RX ADMIN — METOPROLOL SUCCINATE 50 MG: 50 TABLET, EXTENDED RELEASE ORAL at 08:08

## 2023-10-16 RX ADMIN — ASPIRIN 81 MG: 81 TABLET, COATED ORAL at 08:07

## 2023-10-16 RX ADMIN — Medication 400 MG: at 12:07

## 2023-10-16 RX ADMIN — HYDROXYZINE HYDROCHLORIDE 25 MG: 25 TABLET, FILM COATED ORAL at 13:28

## 2023-10-16 RX ADMIN — AMIODARONE HYDROCHLORIDE 200 MG: 200 TABLET ORAL at 08:08

## 2023-10-16 ASSESSMENT — ACTIVITIES OF DAILY LIVING (ADL)
ADLS_ACUITY_SCORE: 40
ADLS_ACUITY_SCORE: 36
ADLS_ACUITY_SCORE: 40
ADLS_ACUITY_SCORE: 36
ADLS_ACUITY_SCORE: 38
ADLS_ACUITY_SCORE: 36
ADLS_ACUITY_SCORE: 40
ADLS_ACUITY_SCORE: 40
DEPENDENT_IADLS:: INDEPENDENT
ADLS_ACUITY_SCORE: 40

## 2023-10-16 NOTE — PROGRESS NOTES
[facility]    Medicine Progress Note - Hospitalist Service    Date of Admission:  10/15/2023    Assessment & Plan     Maribel Buenrostro is a 77 year old female who was just hospitalized at [facility] (9/29-10/13/23) for NSTEMI requiring 3-v CABG on 10/4 who presented to the ED at [facility] from TCU on 10/15/2023 for evaluation of shortness of breath, and was found to have a mild CHF exacerbation. Attempt to transfer to [facility] from the ED was unsuccessful due to lack of cardiac beds     Acute on chronic diastolic CHF due to possible hypertensive emergency  Recent NSTEMI s/p 3-v CABG (LIMA to LAD, SVG to ramus, SVG to diag) on 10/4/23  Elevated troponin due to recent NSTEMI  Hypertrophic cardiomyopathy  *9/29-10/13/23: Hospitalized at [facility] for evaluation of dizziness, and was found to be in a-fib/RVR. Subsequently found to have NSTEMI and ultimately underwent 3-v CABG on 10/4. At the time of discharge, she did not feel medically ready, but because vital signs were stable, she was discharged to TCU  *Meds at discharge: ASA 81 mg daily, metoprolol ER 50 mg daily, lisinopril 5 mg daily, atorvastatin 80 mg daily  *Presented on this admission (10/15) with acute shortness of breath following therapy. Hypertensive to 180s systolic on arrival; other VSS on RA. Trop 71, but was elevated to >3300 during her recent hospitalization; repeat trop 68. NTpBNP>7000 and CXR showed pulmonary edema. Recent TTE (10/6) showed EF 64%, stable severe apical LVH   *In the ED, she was initiated on IV Lasix  -- Will repeat Lasix 40 mg IV x1 on dose  reassess in AM  - Continue lisninopril  10 mg daily  - Cont ASA, metoprolol, and atorvastatin as previously prescribed      # Hypomagnesemia  Mag replacment protcol     Prolonged QTc  QTc 508 ms on arrival  - High K and Mg protocol ordered  - Monitor on telemetry  - Repeat EKG today qtc 487   Leukocytosis   WBC 16.7. She has no localizing symptoms of  "infection. Suspect stress demargination from recent surgery. Last WBC 17.5 on 10/11  - =     Paroxysmal atrial fibrillation  *Diagnosed 8/2023 at [facility]; underwent successful DCCV on 8/7  *PTA regimen: metoprolol ER 50 mg daily, amiodarone 200 mg daily, apixaban 5 mg BID  - In sinus rhythm on admission  - Cont PTA meds     Cox's esophagus  IBS  Chronic and stable on PPI prn     Alcohol use disorder  Has not had alcohol since before her recent hospitalization               Observation Goals: -diagnostic tests and consults completed and resulted, -vital signs normal or at patient baseline, -dyspnea improved and O2 sats greater than 88% on room air or prior home oxygen levels', Nurse to notify provider when observation goals have been met and patient is ready for discharge.  Diet: Combination Diet 2 gm NA Diet    DVT Prophylaxis: Pneumatic Compression Devices  Navarro Catheter: Not present  Lines: None     Cardiac Monitoring: ACTIVE order. Indication: Acute decompensated heart failure (48 hours)  Code Status: No CPR- Do NOT Intubate      Clinically Significant Risk Factors Present on Admission            # Hypomagnesemia: Lowest Mg = 1.6 mg/dL in last 2 days, will replace as needed    # Drug Induced Coagulation Defect: home medication list includes an anticoagulant medication  # Drug Induced Platelet Defect: home medication list includes an antiplatelet medication   # Hypertension: Noted on problem list      # Obesity: Estimated body mass index is 33.07 kg/m  as calculated from the following:    Height as of this encounter: 1.575 m (5' 2\").    Weight as of this encounter: 82 kg (180 lb 12.8 oz).       # Financial/Environmental Concerns: none         Disposition Plan      Expected Discharge Date: 10/17/2023,  3:00 PM    Destination: inpatient rehabilitation facility              Rocky Gunderson MD  Hospitalist Service  [facility]  Securely message with [employer] (more " info)  Text page via [facility] Paging/Directory   ______________________________________________________________________    Interval History   Patient seen and examined at bedside.  Patient states she is continues to feel short of breath.  Patient verbalized that she was discharged early from the outside hospital.  She continues to have swelling in her legs.  She does not want to be discharged back to the same rehab and wants  to look for a new place    Physical Exam   Vital Signs: Temp: 98.3  F (36.8  C) Temp src: Oral BP: (!) 155/76 Pulse: 63   Resp: 16 SpO2: 97 % O2 Device: None (Room air)    Weight: 180 lbs 12.8 oz    Physical Exam  Cardiovascular:      Rate and Rhythm: Normal rate and regular rhythm.   Pulmonary:      Effort: No respiratory distress.      Breath sounds: Normal breath sounds. No wheezing.   Abdominal:      General: There is no distension.      Palpations: Abdomen is soft.      Tenderness: There is no abdominal tenderness.   Musculoskeletal:      Right lower leg: Edema present.      Left lower leg: Edema present.          Medical Decision Making             Data     I have personally reviewed the following data over the past 24 hrs:    16.8 (H)  \   11.8   / 387     134 (L) 93 (L) 10.1 /  108 (H)   3.7 30 (H) 0.57 \       Imaging results reviewed over the past 24 hrs:   No results found for this or any previous visit (from the past 24 hour(s)).

## 2023-10-16 NOTE — PROGRESS NOTES
Patient is A&Ox 4. Vitals are stable on RA. Tele NSR. Voiding adequately with external pure wick, No BM this shift. Abnormal/trending labs this shift were BNP>7000. Abnormal assessment points included -none. Patient is up with assist of 1, Sleep Quality was good. Plan is for possible discharge today pending dyspnea occurrence.

## 2023-10-16 NOTE — CONSULTS
[facility]    Cardiology Consultation     Date of Admission:  10/15/2023    Assessment & Plan   Maribel Buenrostro is a 77 year old female who was admitted on 10/15/2023.    1.  Acute diastolic heart failure with evidence of pulmonary edema on chest x-ray and NT proBNP of 8000    2.  Hypertension with elevated blood pressures    3.  Apical variant hypertrophic cardiomyopathy    4.  Paroxysmal atrial fibrillation, currently in sinus rhythm on amiodarone and Eliquis    5.  Coronary artery disease status post recent NSTEMI and three-vessel bypass surgery on 10/4/2023.  Elevated troponin on admission here is likely residual from recent NSTEMI and bypass surgery.  No new anginal symptoms.    6.  Hypercholesterolemia    Recommendations:    The patient remains tachypneic and in pulmonary edema today.  She has had minimal diuresis with Lasix 40 mg IV.  Blood pressure remains severely elevated in the 160s/80s.    I will increase Lasix to 60 mg IV every 8 hours  Keep metoprolol XL 50 mg daily due to marginally low heart rates  Increase lisinopril to 20 mg daily  Add amlodipine 5 mg daily    Patient cannot be discharged tomorrow.  I will take several days to adjust her medications for optimal blood pressure and volume control.    High complexity     AUBREE BOSCH MD, MD    Primary Care Physician   [facility]    Reason for Consult   Reason for consult: I was asked by the hospitalist service to evaluate this patient for shortness of breath.    History of Present Illness   Maribel Buenrostro is a 77 year old female who presents with progressive shortness of breath since her recent discharge from [facility] after her bypass surgery.  She initially presented there with A-fib with RVR and evidence of non-STEMI on 9/29/2023 and underwent three-vessel bypass surgery on 10/4/2023 with a LIMA graft to the LAD, vein graft to the ramus, and vein graft to the diagonal.  She was discharged on  10/13/2023 without diuretic.  She indicates that she did not feel comfortable leaving at that time because she was having difficulty breathing.  Her breathing progressively got worse over the course of the next 48 hours and she was sent from her TCU to [facility] emergency room.  She was found to be in pulmonary edema by chest x-ray with NT proBNP of nearly 8000.  She was in normal rhythm on admission here, maintained on amiodarone 200 mg daily and apixaban 5 mg p.o. twice daily.    She was given furosemide 40 mg IV on 10/15/2023 but no I's and O's were recorded.  She is given a second dose of furosemide 40 mg IV today 10/16/2023 with about 500 cc of urine output.    She remains visibly tachypneic and has difficulty completing a sentence.  Feels anxious because she cannot breathe.  Denies chest pain, palpitations, lightheadedness, syncope.    Past Medical History   Past Medical History:   Diagnosis Date    Cox's esophagus     CAD (coronary artery disease)     s/p 3-v CABG (LIMA to LAD, SVG to ramus, SVG to diag) on 10/4/23    Hypertrophic cardiomyopathy (H)     Irritable bowel syndrome     Paroxysmal atrial fibrillation (H)          Past Surgical History   Past Surgical History:   Procedure Laterality Date    AL CABG, ARTERY-VEIN, THREE           Prior to Admission Medications   Prior to Admission Medications   Prescriptions Last Dose Informant Patient Reported? Taking?   HYDROmorphone (DILAUDID) 2 MG tablet  [facility] Yes Yes   Sig: Take 1 mg by mouth every 4 hours as needed for severe pain   Vitamin D3 (CHOLECALCIFEROL) 25 mcg (1000 units) tablet 10/15/2023 at 0800 Nursing Home Yes Yes   Sig: Take 25 mcg by mouth daily   acetaminophen (TYLENOL) 500 MG tablet  at [facility] Yes Yes   Sig: Take 1,000 mg by mouth every 6 hours as needed for mild pain   amiodarone (PACERONE) 200 MG tablet 10/15/2023 at AM Nursing Home Yes Yes   Sig: Take 200 mg by mouth daily   apixaban  ANTICOAGULANT (ELIQUIS) 5 MG tablet 10/15/2023 at 0800 Nursing Home Yes Yes   Sig: Take 5 mg by mouth 2 times daily   aspirin 81 MG EC tablet 10/15/2023 at 08 Nursing Home Yes Yes   Sig: Take 81 mg by mouth daily   atorvastatin (LIPITOR) 80 MG tablet 10/15/2023 at 0800 Nursing Home Yes Yes   Sig: Take 80 mg by mouth daily   bisacodyl (DULCOLAX) 5 MG EC tablet  at [facility] Yes Yes   Sig: Take 5 mg by mouth daily as needed for constipation   famotidine (PEPCID) 20 MG tablet 10/15/2023 at 0800 Nursing Home Yes Yes   Sig: Take 20 mg by mouth 2 times daily   furosemide (LASIX) 20 MG tablet 10/15/2023 at [address] [place] [address] Home Yes Yes   Sig: Take 20 mg by mouth daily   hydrOXYzine (ATARAX) 25 MG tablet  at [facility] Yes Yes   Sig: Take 25 mg by mouth every 4 hours as needed for itching (adjuvant pain)   lisinopril (ZESTRIL) 5 MG tablet 10/15/2023 at 08 Nursing Home Yes Yes   Sig: Take 5 mg by mouth daily   metoprolol succinate ER (TOPROL XL) 25 MG 24 hr tablet 10/15/2023 at 0800 Nursing Home Yes Yes   Sig: Take 50 mg by mouth daily   multivitamin w/minerals (THERA-VIT-M) tablet 10/15/2023 at 08 Nursing Home Yes Yes   Sig: Take 1 tablet by mouth daily   omeprazole (PRILOSEC OTC) 20 MG EC tablet  at [facility] Yes Yes   Sig: Take 20 mg by mouth daily as needed (symptoms of GERD)   ondansetron (ZOFRAN ODT) 4 MG ODT tab  at [facility] Yes Yes   Sig: Take 4 mg by mouth every 8 hours as needed for nausea   polyethylene glycol (MIRALAX) 17 g packet 10/15/2023 at 0800 Nursing Home Yes Yes   Sig: Take 1 packet by mouth daily   potassium chloride ER (KLOR-CON M) 10 MEQ CR tablet 10/15/2023 at 08 Nursing Home Yes Yes   Sig: Take 10 mEq by mouth daily   senna-docusate (SENOKOT-S/PERICOLACE) 8.6-50 MG tablet  at [facility] Yes Yes   Sig: Take 1 tablet by mouth daily as needed for constipation (1-2) at bedtime   traMADol (ULTRAM) 50 MG tablet 10/15/2023 at [facility] Yes Yes   Sig: Take 50 mg  "by mouth every 6 hours as needed for severe pain   vitamin B-Complex 10/15/2023 at 0800 Nursing Home Yes Yes   Sig: Take 1 tablet by mouth daily      Facility-Administered Medications: None     Current Facility-Administered Medications   Medication Dose Route Frequency    amiodarone  200 mg Oral Daily    amLODIPine  5 mg Oral Daily    apixaban ANTICOAGULANT  5 mg Oral BID    aspirin  81 mg Oral Daily    furosemide  60 mg Intravenous Q12H    lisinopril  10 mg Oral Once    [START ON 10/17/2023] lisinopril  20 mg Oral Daily    metoprolol succinate ER  50 mg Oral Daily    sodium chloride (PF)  3 mL Intracatheter Q8H     Current Facility-Administered Medications   Medication Last Rate     Allergies   No Known Allergies    Social History      History of alcohol use.  Has not used alcohol since before her recent admission.  Quit smoking many years ago.    Family History     Negative family history for premature coronary artery disease.  No knowledge of hypertrophic cardiomyopathy.       Review of Systems   A comprehensive review of system was performed and is negative other than that noted in the HPI or here.     Physical Exam   Vital Signs with Ranges  Temp:  [97.5  F (36.4  C)-98.3  F (36.8  C)] 97.7  F (36.5  C)  Pulse:  [60-77] 60  Resp:  [16-20] 18  BP: (141-176)/() 161/83  SpO2:  [92 %-97 %] 92 %  Wt Readings from Last 4 Encounters:   10/16/23 82 kg (180 lb 12.8 oz)     No intake/output data recorded.      Vitals: BP (!) 161/83 (BP Location: Right arm)   Pulse 60   Temp 97.7  F (36.5  C) (Oral)   Resp 18   Ht 1.575 m (5' 2\")   Wt 82 kg (180 lb 12.8 oz)   SpO2 92%   BMI 33.07 kg/m      Physical Exam:   General - Alert and oriented to time place and person in no acute distress  Eyes - No scleral icterus  HEENT - Neck supple, moist mucous membranes  Cardiovascular -regular rate and rhythm.  No cardiac murmur.  Extremities - There is 1+ bilateral lower extremity edema edema  Respiratory -decreased breath " "sounds at the bases, diffuse crackles  Skin - No pallor or cyanosis  Gastrointestinal - Non tender and non distended without rebound or guarding  Psych - Appropriate affect   Neurological - No gross motor neurological focal deficits    No lab results found in last 7 days.    Invalid input(s): \"TROPONINIES\"    Recent Labs   Lab 10/16/23  0542 10/15/23  2318 10/15/23  0944   WBC 16.8*  --  16.7*   HGB 11.8  --  12.1   MCV 93  --  94     --  409   *  --  135   POTASSIUM 3.7 3.4 3.8   CHLORIDE 93*  --  91*   CO2 30*  --  30*   BUN 10.1  --  6.8*   CR 0.57  --  0.56   GFRESTIMATED >90  --  >90   ANIONGAP 11  --  14   JUAN 9.1  --  9.1   *  --  114*     No results for input(s): \"CHOL\", \"HDL\", \"LDL\", \"TRIG\", \"CHOLHDLRATIO\" in the last 81267 hours.  Recent Labs   Lab 10/16/23  0542 10/15/23  0944   WBC 16.8* 16.7*   HGB 11.8 12.1   HCT 34.8* 35.9   MCV 93 94    409     No results for input(s): \"PH\", \"PHV\", \"PO2\", \"PO2V\", \"SAT\", \"PCO2\", \"PCO2V\", \"HCO3\", \"HCO3V\" in the last 168 hours.  Recent Labs   Lab 10/15/23  0944   NTBNPI 7,854*     No results for input(s): \"DD\" in the last 168 hours.  No results for input(s): \"SED\", \"CRP\" in the last 168 hours.  Recent Labs   Lab 10/16/23  0542 10/15/23  0944    409     No results for input(s): \"TSH\" in the last 168 hours.  No results for input(s): \"COLOR\", \"APPEARANCE\", \"URINEGLC\", \"URINEBILI\", \"URINEKETONE\", \"SG\", \"UBLD\", \"URINEPH\", \"PROTEIN\", \"UROBILINOGEN\", \"NITRITE\", \"LEUKEST\", \"RBCU\", \"WBCU\" in the last 168 hours.    Imaging:  Recent Results (from the past 48 hour(s))   XR Chest 2 Views    Narrative    EXAM: XR CHEST 2 VIEWS  LOCATION: [facility]  DATE: 10/15/2023    INDICATION: sob  COMPARISON: 8/4/2023      Impression    IMPRESSION: Low lung volumes with small bilateral pleural effusions and associated airspace opacities. Pulmonary vascular crowding with suggestion of mild interstitial pulmonary edema.    Unchanged " "enlarged cardiomediastinal silhouette with sternotomy wires.    Degenerative changes in the shoulders with old fracture deformity of the left humerus.       Echo:  No results found for this or any previous visit (from the past 4320 hour(s)).    Clinically Significant Risk Factors Present on Admission            # Hypomagnesemia: Lowest Mg = 1.6 mg/dL in last 2 days, will replace as needed    # Drug Induced Coagulation Defect: home medication list includes an anticoagulant medication  # Drug Induced Platelet Defect: home medication list includes an antiplatelet medication   # Hypertension: Noted on problem list      # Obesity: Estimated body mass index is 33.07 kg/m  as calculated from the following:    Height as of this encounter: 1.575 m (5' 2\").    Weight as of this encounter: 82 kg (180 lb 12.8 oz).       # Financial/Environmental Concerns: none        Cardiac Arrhythmia: Atrial fibrillation: Paroxysmal                                                    "

## 2023-10-16 NOTE — CONSULTS
Care Management Initial Consult    General Information  Assessment completed with: Maribel Contreras  Type of CM/SW Visit: Initial Assessment    Primary Care Provider verified and updated as needed: Yes   Readmission within the last 30 days: previous discharge plan unsuccessful   Return Category: Exacerbation of disease  Reason for Consult: discharge planning    Communication Assessment  Patient's communication style: spoken language (English or Bilingual)       Cognitive  Cognitive/Neuro/Behavioral: WDL     Arousal Level: opens eyes spontaneously                Living Environment:   People in home: child(ciaran), adult  Unknown  Current living Arrangements: apartment      Able to return to prior arrangements: yes     Family/Social Support:  Care provided by: self  Provides care for: no one  Marital Status:   Children          Description of Support System: Supportive    Support Assessment: Adequate social supports    Current Resources:   Patient receiving home care services: No     Community Resources: None  Equipment currently used at home: None  Supplies currently used at home: None    Employment/Financial:  Employment Status: retired        Financial Concerns: none   Referral to Financial Worker: No    Does the patient's insurance plan have a 3 day qualifying hospital stay waiver?  No    Lifestyle & Psychosocial Needs:  Social Determinants of Health     Food Insecurity: Not on file   Depression: Not on file   Housing Stability: Not on file   Tobacco Use: Not on file   Financial Resource Strain: Not on file   Alcohol Use: Not on file   Transportation Needs: Not on file   Physical Activity: Not on file   Interpersonal Safety: Not on file   Stress: Not on file   Social Connections: Not on file       Functional Status:  Prior to admission patient needed assistance:   Dependent ADLs:: Independent  Dependent IADLs:: Independent  Assesssment of Functional Status: Not at  functional baseline    Mental Health  Status:  Mental Health Status: No Current Concerns  Mental Health Management: Other (see comment) (None)    Chemical Dependency Status:  Chemical Dependency Status: Past Concern        Additional Information:  Writer met with patient per consult for discharge planning. Writer reviewed patient's chart. Maribel Beunrostro is a 77 year old female presents to the ER at 0911 on Sunday morning via EMS from [facility] where she is undergoing rehab.  The patient was just discharged 2 days ago from [facility] on Friday (10/13) after she underwent a three-vessel bypass at [facility] on 10/4/2023.  The patient intentionally came to [facility], rather than going back to [facility] because she feels she was discharged from [facility] too early.     Writer noted there is a consult for cardiology and the patient is currently on observation. Patient is still assistive one and would potentially need transitional care for discharge. Writer got choices from the patient to send referrals to with [facility] and [facility]. Patient was agreeable to referrals being sent but said she does not feel that she would be ready for discharge today. Writer explained this would be the decision of the MD.     Patient lived with her brother but he recently passed and so now patient lives with her daughter. Patient's daughter is currently in [place] for her daughter's wedding until November. Patient was relatively independent prior to surgery. Patient has no financial concerns at this time.      Addendum: Writer spoke with patient and she is in agreement with going to [facility]. Patient's son would be available tomorrow to take patient to facility. MD was updated and cardiology consult still to be completed.    [facility] Management

## 2023-10-17 LAB
ANION GAP SERPL CALCULATED.3IONS-SCNC: 10 MMOL/L (ref 7–15)
ATRIAL RATE - MUSE: 62 BPM
BUN SERPL-MCNC: 8.9 MG/DL (ref 8–23)
CALCIUM SERPL-MCNC: 9 MG/DL (ref 8.8–10.2)
CHLORIDE SERPL-SCNC: 92 MMOL/L (ref 98–107)
CREAT SERPL-MCNC: 0.6 MG/DL (ref 0.51–0.95)
DEPRECATED HCO3 PLAS-SCNC: 32 MMOL/L (ref 22–29)
DIASTOLIC BLOOD PRESSURE - MUSE: NORMAL MMHG
EGFRCR SERPLBLD CKD-EPI 2021: >90 ML/MIN/1.73M2
GLUCOSE SERPL-MCNC: 113 MG/DL (ref 70–99)
INTERPRETATION ECG - MUSE: NORMAL
MAGNESIUM SERPL-MCNC: 1.7 MG/DL (ref 1.7–2.3)
P AXIS - MUSE: 31 DEGREES
POTASSIUM SERPL-SCNC: 3.7 MMOL/L (ref 3.4–5.3)
PR INTERVAL - MUSE: 140 MS
QRS DURATION - MUSE: 96 MS
QT - MUSE: 480 MS
QTC - MUSE: 487 MS
R AXIS - MUSE: 34 DEGREES
SODIUM SERPL-SCNC: 134 MMOL/L (ref 135–145)
SYSTOLIC BLOOD PRESSURE - MUSE: NORMAL MMHG
T AXIS - MUSE: 161 DEGREES
VENTRICULAR RATE- MUSE: 62 BPM

## 2023-10-17 PROCEDURE — 250N000013 HC RX MED GY IP 250 OP 250 PS 637: Performed by: STUDENT IN AN ORGANIZED HEALTH CARE EDUCATION/TRAINING PROGRAM

## 2023-10-17 PROCEDURE — 250N000011 HC RX IP 250 OP 636: Mod: JZ | Performed by: INTERNAL MEDICINE

## 2023-10-17 PROCEDURE — 99232 SBSQ HOSP IP/OBS MODERATE 35: CPT | Performed by: STUDENT IN AN ORGANIZED HEALTH CARE EDUCATION/TRAINING PROGRAM

## 2023-10-17 PROCEDURE — 120N000001 HC R&B MED SURG/OB

## 2023-10-17 PROCEDURE — 80048 BASIC METABOLIC PNL TOTAL CA: CPT | Performed by: INTERNAL MEDICINE

## 2023-10-17 PROCEDURE — 250N000013 HC RX MED GY IP 250 OP 250 PS 637: Performed by: INTERNAL MEDICINE

## 2023-10-17 PROCEDURE — 36415 COLL VENOUS BLD VENIPUNCTURE: CPT | Performed by: INTERNAL MEDICINE

## 2023-10-17 PROCEDURE — 83735 ASSAY OF MAGNESIUM: CPT | Performed by: STUDENT IN AN ORGANIZED HEALTH CARE EDUCATION/TRAINING PROGRAM

## 2023-10-17 PROCEDURE — 99232 SBSQ HOSP IP/OBS MODERATE 35: CPT | Performed by: INTERNAL MEDICINE

## 2023-10-17 RX ORDER — MAGNESIUM OXIDE 400 MG/1
400 TABLET ORAL EVERY 4 HOURS
Status: COMPLETED | OUTPATIENT
Start: 2023-10-17 | End: 2023-10-17

## 2023-10-17 RX ORDER — POTASSIUM CHLORIDE 1500 MG/1
20 TABLET, EXTENDED RELEASE ORAL ONCE
Status: COMPLETED | OUTPATIENT
Start: 2023-10-17 | End: 2023-10-17

## 2023-10-17 RX ADMIN — Medication 400 MG: at 13:05

## 2023-10-17 RX ADMIN — FUROSEMIDE 60 MG: 10 INJECTION, SOLUTION INTRAMUSCULAR; INTRAVENOUS at 06:55

## 2023-10-17 RX ADMIN — TRAMADOL HYDROCHLORIDE 50 MG: 50 TABLET, COATED ORAL at 19:33

## 2023-10-17 RX ADMIN — METOPROLOL SUCCINATE 50 MG: 50 TABLET, EXTENDED RELEASE ORAL at 08:24

## 2023-10-17 RX ADMIN — APIXABAN 5 MG: 5 TABLET, FILM COATED ORAL at 21:32

## 2023-10-17 RX ADMIN — AMIODARONE HYDROCHLORIDE 200 MG: 200 TABLET ORAL at 08:24

## 2023-10-17 RX ADMIN — AMLODIPINE BESYLATE 5 MG: 5 TABLET ORAL at 08:24

## 2023-10-17 RX ADMIN — LISINOPRIL 20 MG: 20 TABLET ORAL at 08:24

## 2023-10-17 RX ADMIN — APIXABAN 5 MG: 5 TABLET, FILM COATED ORAL at 08:24

## 2023-10-17 RX ADMIN — FUROSEMIDE 60 MG: 10 INJECTION, SOLUTION INTRAMUSCULAR; INTRAVENOUS at 17:50

## 2023-10-17 RX ADMIN — POTASSIUM CHLORIDE 20 MEQ: 1500 TABLET, EXTENDED RELEASE ORAL at 08:24

## 2023-10-17 RX ADMIN — ASPIRIN 81 MG: 81 TABLET, COATED ORAL at 08:24

## 2023-10-17 RX ADMIN — Medication 400 MG: at 08:24

## 2023-10-17 RX ADMIN — TRAMADOL HYDROCHLORIDE 50 MG: 50 TABLET, COATED ORAL at 11:09

## 2023-10-17 ASSESSMENT — ACTIVITIES OF DAILY LIVING (ADL)
ADLS_ACUITY_SCORE: 42
ADLS_ACUITY_SCORE: 38
ADLS_ACUITY_SCORE: 38
ADLS_ACUITY_SCORE: 42
ADLS_ACUITY_SCORE: 38
ADLS_ACUITY_SCORE: 42
ADLS_ACUITY_SCORE: 38

## 2023-10-17 NOTE — PROGRESS NOTES
[facility]    Medicine Progress Note - Hospitalist Service    Date of Admission:  10/15/2023    Assessment & Plan   Maribel Buenrostro is a 77 year old female who was just hospitalized at [facility] (9/29-10/13/23) for NSTEMI requiring 3-v CABG on 10/4 who presented to the ED at [facility] from TCU on 10/15/2023 for evaluation of shortness of breath, and was found to have a mild CHF exacerbation. Attempt to transfer to [facility] from the ED was unsuccessful due to lack of cardiac beds     Acute on chronic diastolic CHF due to possible hypertensive emergency  Recent NSTEMI s/p 3-v CABG (LIMA to LAD, SVG to ramus, SVG to diag) on 10/4/23  Elevated troponin due to recent NSTEMI  Hypertrophic cardiomyopathy  *9/29-10/13/23: Hospitalized at [facility] for evaluation of dizziness, and was found to be in a-fib/RVR. Subsequently found to have NSTEMI and ultimately underwent 3-v CABG on 10/4. At the time of discharge, she did not feel medically ready, but because vital signs were stable, she was discharged to TCU  *Meds at discharge: ASA 81 mg daily, metoprolol ER 50 mg daily, lisinopril 5 mg daily, atorvastatin 80 mg daily  *Presented on this admission (10/15) with acute shortness of breath following therapy. Hypertensive to 180s systolic on arrival; other VSS on RA. Trop 71, but was elevated to >3300 during her recent hospitalization; repeat trop 68. NTpBNP>7000 and CXR showed pulmonary edema. Recent TTE (10/6) showed EF 64%, stable severe apical LVH   *In the ED, she was initiated on IV Lasix  -- - Continue lisninopril  2 mg daily  - Cont ASA, metoprolol, and atorvastatin as previously prescribed  Lasix 60mg TID  -Monitor electrolytes magnesium and Potassium and Kidney function on diuretics    Intake/Output Summary (Last 24 hours) at 10/17/2023 1024  Last data filed at 10/17/2023 0828  Gross per 24 hour   Intake --   Output 3150 ml   Net -3150 ml        # Hypomagnesemia  Mag replacment  "protcol     Prolonged QTc  QTc 508 ms on arrival  - High K and Mg protocol ordered  - Monitor on telemetry  - Repeat EKG today qtc 487   Leukocytosis   WBC 16.7. She has no localizing symptoms of infection. Suspect stress demargination from recent surgery. Last WBC 17.5 on 10/11  - =     Paroxysmal atrial fibrillation  *Diagnosed 8/2023 at [facility]; underwent successful DCCV on 8/7  *PTA regimen: metoprolol ER 50 mg daily, amiodarone 200 mg daily, apixaban 5 mg BID  - In sinus rhythm on admission  - Cont PTA meds     Cox's esophagus  IBS  Chronic and stable on PPI prn     Alcohol use disorder  Has not had alcohol since before her recent hospitalization      # Weakness  PT and OT  -Boost supplement ordered             Diet: Combination Diet 2 gm NA Diet    DVT Prophylaxis: DOAC  Navarro Catheter: Not present  Lines: None     Cardiac Monitoring: ACTIVE order. Indication: Acute decompensated heart failure (48 hours)  Code Status: No CPR- Do NOT Intubate      Clinically Significant Risk Factors Present on Admission            # Hypomagnesemia: Lowest Mg = 1.6 mg/dL in last 2 days, will replace as needed    # Drug Induced Coagulation Defect: home medication list includes an anticoagulant medication  # Drug Induced Platelet Defect: home medication list includes an antiplatelet medication   # Hypertension: Noted on problem list      # Obesity: Estimated body mass index is 33.07 kg/m  as calculated from the following:    Height as of this encounter: 1.575 m (5' 2\").    Weight as of this encounter: 82 kg (180 lb 12.8 oz).       # Financial/Environmental Concerns: none         Disposition Plan      Expected Discharge Date: 10/18/2023      Destination: inpatient rehabilitation facility              Rocky Gunderson MD  Hospitalist Service  [facility]  Securely message with [employer] (more info)  Text page via [employer] Paging/Directory "   ______________________________________________________________________    Interval History   Patient seen and examined at bedside.  No acute overnight events.  Feels better since admission but still continues to have shortness of breath and swelling about extremities.  She feels weak.  She was asking for boost supplements.    No abdominal pain or chest pain   Physical Exam   Vital Signs: Temp: 98.3  F (36.8  C) Temp src: Oral BP: 133/65 Pulse: 60   Resp: 18 SpO2: 97 % O2 Device: None (Room air)    Weight: 180 lbs 12.8 oz    Physical Exam  Cardiovascular:      Rate and Rhythm: Normal rate and regular rhythm.      Heart sounds: Normal heart sounds.   Pulmonary:      Effort: Pulmonary effort is normal. No respiratory distress.   Abdominal:      General: There is no distension.      Palpations: Abdomen is soft.      Tenderness: There is no abdominal tenderness.   Musculoskeletal:      Right lower leg: Edema present.      Left lower leg: Edema present.        Medical Decision Making             Data     I have personally reviewed the following data over the past 24 hrs:    N/A  \   N/A   / N/A     134 (L) 92 (L) 8.9 /  113 (H)   3.7 32 (H) 0.60 \       Imaging results reviewed over the past 24 hrs:   No results found for this or any previous visit (from the past 24 hour(s)).

## 2023-10-17 NOTE — PLAN OF CARE
Goal Outcome Evaluation:           Overall Patient Progress: no changeOverall Patient Progress: no change         A&O x 4. BP high to 150s SBP, otherwise VSS on RA. Tele: SR w/ qt prolongation. Assistx1 w/ sternal precautions. 2g Na diet, tolerating well. PIV to L forearm, SL. Pt endorses SOB and ROWLAND, denies pain. Continent of B&B, purewick in place for overnight diuresis. No BM this shift. Continue plan of care.

## 2023-10-17 NOTE — PLAN OF CARE
ORIENTATION/NEURO: A/o x 4, neuro intact  VITALS/TELE: VSS, tele NSR with inv T wave  PAIN: Sternal incision pain- PRN tramadol effective for pain relief  RESP: RA, ROWLAND, improving per pt. Bilat LS dim in bases  DIET: 2 gm Na, Boost supplement ordered per pt request  GI/: Purwick in place, good UO. BM x 1  LINES/DRAINS: L PIV SL  LABS: K and Mg replaced per protocols, recheck labs in AM  SKIN: Sternal midline incision and old CT sites DANIEL. LLE harvest sites DANIEL. Scattered bruising  ASSIST/AMBULATION: Ax1, W/GB  FALL RISK: Yes  PLAN/DISPOSITION: Continue with diuretics, monitor I\O. Encourage activity      Goal Outcome Evaluation: progressing

## 2023-10-17 NOTE — PROGRESS NOTES
A&O x4, VSS with hypertension, on RA-1L O2. Tele: SR/SBPain denied, Atarax x1 for anxiety. LS: dim BS: audible. Purewick patent, adequate void. Old sternal and CT sites WDL. Up A-1 GB/W

## 2023-10-17 NOTE — PROGRESS NOTES
"       Assessment and Plan:     Maribel Buenrostro is a 77 year old female who was admitted on 10/15/2023.     1.  Acute diastolic heart failure with evidence of pulmonary edema on chest x-ray and NT proBNP of 8000     2.  Hypertension with elevated blood pressures     3.  Apical variant hypertrophic cardiomyopathy     4.  Paroxysmal atrial fibrillation, currently in sinus rhythm on amiodarone and Eliquis     5.  Coronary artery disease status post recent NSTEMI and three-vessel bypass surgery on 10/4/2023.  Elevated troponin on admission here is likely residual from recent NSTEMI and bypass surgery.  No new anginal symptoms.     6.  Hypercholesterolemia     Recommendations:     Excellent diuresis yesterday and overnight 2.5 L urine output yesterday and 700 cc so far today.  She continues to have evidence of volume overload.  We will continue diuresis today.  Kidney function and potassium remain normal.    Blood pressure control much improved with medication adjustments.  We will consider increasing the lisinopril dose back up to 20 mg daily if the blood pressure stabilizes at current levels.  For now, continue amlodipine 5 mg daily, metoprolol XL 50 mg daily.    AUBREE BOSCH MD        Interval History:     Shortness of breath and edema is improving but not resolved yet.          Medications:      amiodarone  200 mg Oral Daily    amLODIPine  5 mg Oral Daily    apixaban ANTICOAGULANT  5 mg Oral BID    aspirin  81 mg Oral Daily    furosemide  60 mg Intravenous Q12H    lisinopril  20 mg Oral Daily    magnesium oxide  400 mg Oral Q4H    metoprolol succinate ER  50 mg Oral Daily    sodium chloride (PF)  3 mL Intracatheter Q8H            Physical Exam:   Blood pressure 107/54, pulse 58, temperature 98  F (36.7  C), temperature source Oral, resp. rate 18, height 1.575 m (5' 2\"), weight 82 kg (180 lb 12.8 oz), SpO2 97%.  Vitals:    10/15/23 0920 10/16/23 0537   Weight: 86.2 kg (190 lb) 82 kg (180 lb 12.8 oz) " "        Intake/Output Summary (Last 24 hours) at 10/17/2023 1242  Last data filed at 10/17/2023 0828  Gross per 24 hour   Intake --   Output 3150 ml   Net -3150 ml       10/12 0700 - 10/17 0659  In: -   Out: 2450 [Urine:2450]  Net: -2450    Exam:  GENERAL APPEARANCE ADULT: Alert, in no acute distress  RESP: lungs clear to auscultation   CV: regular rate and rhythm, no murmur, rub, or gallop  ABDOMEN: normal bowel sounds, soft, nontender, no hepatosplenomegaly or other masses  EXTREMITIES: Edema 1+         Data:   LABS (Last four results)  CMP  Recent Labs   Lab 10/17/23  0538 10/16/23  0542 10/15/23  2318 10/15/23  1247 10/15/23  0944   * 134*  --   --  135   POTASSIUM 3.7 3.7 3.4  --  3.8   CHLORIDE 92* 93*  --   --  91*   CO2 32* 30*  --   --  30*   ANIONGAP 10 11  --   --  14   * 108*  --   --  114*   BUN 8.9 10.1  --   --  6.8*   CR 0.60 0.57  --   --  0.56   GFRESTIMATED >90 >90  --   --  >90   JUAN 9.0 9.1  --   --  9.1   MAG 1.7 1.8  --  1.6*  --      CBC  Recent Labs   Lab 10/16/23  0542 10/15/23  0944   WBC 16.8* 16.7*   RBC 3.75* 3.81   HGB 11.8 12.1   HCT 34.8* 35.9   MCV 93 94   MCH 31.5 31.8   MCHC 33.9 33.7   RDW 14.2 14.2    409     INRNo lab results found in last 7 days.  TROPONINS No results found for: \"TROPI\", \"TROPONIN\", \"TROPR\", \"TROPN\"                                                                                                            AUBREE BOSCH MD   "

## 2023-10-17 NOTE — PROGRESS NOTES
Care Management Follow Up    Length of Stay (days): 1    Expected Discharge Date: 10/18/2023     Concerns to be Addressed: other (see comments) (Placement)     Patient plan of care discussed at interdisciplinary rounds: Yes    Anticipated Discharge Disposition: Transitional Care     Anticipated Discharge Services: None  Anticipated Discharge DME: None    Patient/family educated on Medicare website which has current facility and service quality ratings: yes  Education Provided on the Discharge Plan: Yes  Patient/Family in Agreement with the Plan: yes    Referrals Placed by CM/SW:    Private pay costs discussed: Not applicable    Additional Information:  Writer reviewed patient's chart and noted patient will not be discharging for several days per cardiology. Writer called to let Gisell CANTOR know. MILAGRO will continue to follow for discharge planning.    Stephen ROJASGil[facility]  [facility]

## 2023-10-18 LAB
ANION GAP SERPL CALCULATED.3IONS-SCNC: 8 MMOL/L (ref 7–15)
BUN SERPL-MCNC: 11.7 MG/DL (ref 8–23)
CALCIUM SERPL-MCNC: 8.7 MG/DL (ref 8.8–10.2)
CHLORIDE SERPL-SCNC: 92 MMOL/L (ref 98–107)
CREAT SERPL-MCNC: 0.6 MG/DL (ref 0.51–0.95)
DEPRECATED HCO3 PLAS-SCNC: 32 MMOL/L (ref 22–29)
EGFRCR SERPLBLD CKD-EPI 2021: >90 ML/MIN/1.73M2
ERYTHROCYTE [DISTWIDTH] IN BLOOD BY AUTOMATED COUNT: 14.4 % (ref 10–15)
GLUCOSE BLDC GLUCOMTR-MCNC: 97 MG/DL (ref 70–99)
GLUCOSE SERPL-MCNC: 95 MG/DL (ref 70–99)
HCT VFR BLD AUTO: 34.5 % (ref 35–47)
HGB BLD-MCNC: 11.7 G/DL (ref 11.7–15.7)
MAGNESIUM SERPL-MCNC: 1.8 MG/DL (ref 1.7–2.3)
MCH RBC QN AUTO: 31.6 PG (ref 26.5–33)
MCHC RBC AUTO-ENTMCNC: 33.9 G/DL (ref 31.5–36.5)
MCV RBC AUTO: 93 FL (ref 78–100)
PLATELET # BLD AUTO: 369 10E3/UL (ref 150–450)
POTASSIUM SERPL-SCNC: 3.6 MMOL/L (ref 3.4–5.3)
RBC # BLD AUTO: 3.7 10E6/UL (ref 3.8–5.2)
SODIUM SERPL-SCNC: 132 MMOL/L (ref 135–145)
WBC # BLD AUTO: 14.3 10E3/UL (ref 4–11)

## 2023-10-18 PROCEDURE — 99232 SBSQ HOSP IP/OBS MODERATE 35: CPT | Performed by: STUDENT IN AN ORGANIZED HEALTH CARE EDUCATION/TRAINING PROGRAM

## 2023-10-18 PROCEDURE — 250N000013 HC RX MED GY IP 250 OP 250 PS 637: Performed by: INTERNAL MEDICINE

## 2023-10-18 PROCEDURE — 80048 BASIC METABOLIC PNL TOTAL CA: CPT | Performed by: STUDENT IN AN ORGANIZED HEALTH CARE EDUCATION/TRAINING PROGRAM

## 2023-10-18 PROCEDURE — 36415 COLL VENOUS BLD VENIPUNCTURE: CPT | Performed by: STUDENT IN AN ORGANIZED HEALTH CARE EDUCATION/TRAINING PROGRAM

## 2023-10-18 PROCEDURE — 83735 ASSAY OF MAGNESIUM: CPT | Performed by: STUDENT IN AN ORGANIZED HEALTH CARE EDUCATION/TRAINING PROGRAM

## 2023-10-18 PROCEDURE — 250N000013 HC RX MED GY IP 250 OP 250 PS 637: Performed by: NURSE PRACTITIONER

## 2023-10-18 PROCEDURE — 85027 COMPLETE CBC AUTOMATED: CPT | Performed by: STUDENT IN AN ORGANIZED HEALTH CARE EDUCATION/TRAINING PROGRAM

## 2023-10-18 PROCEDURE — 99232 SBSQ HOSP IP/OBS MODERATE 35: CPT | Mod: FS | Performed by: NURSE PRACTITIONER

## 2023-10-18 PROCEDURE — 120N000001 HC R&B MED SURG/OB

## 2023-10-18 RX ORDER — TORSEMIDE 10 MG/1
40 TABLET ORAL DAILY
Status: DISCONTINUED | OUTPATIENT
Start: 2023-10-18 | End: 2023-10-19

## 2023-10-18 RX ORDER — POTASSIUM CHLORIDE 1500 MG/1
20 TABLET, EXTENDED RELEASE ORAL ONCE
Status: COMPLETED | OUTPATIENT
Start: 2023-10-18 | End: 2023-10-18

## 2023-10-18 RX ORDER — MAGNESIUM OXIDE 400 MG/1
400 TABLET ORAL EVERY 4 HOURS
Status: COMPLETED | OUTPATIENT
Start: 2023-10-18 | End: 2023-10-18

## 2023-10-18 RX ADMIN — LISINOPRIL 20 MG: 20 TABLET ORAL at 08:40

## 2023-10-18 RX ADMIN — Medication 400 MG: at 12:04

## 2023-10-18 RX ADMIN — TRAMADOL HYDROCHLORIDE 50 MG: 50 TABLET, COATED ORAL at 12:04

## 2023-10-18 RX ADMIN — AMIODARONE HYDROCHLORIDE 200 MG: 200 TABLET ORAL at 08:40

## 2023-10-18 RX ADMIN — Medication 400 MG: at 08:40

## 2023-10-18 RX ADMIN — APIXABAN 5 MG: 5 TABLET, FILM COATED ORAL at 08:40

## 2023-10-18 RX ADMIN — POTASSIUM CHLORIDE 20 MEQ: 1500 TABLET, EXTENDED RELEASE ORAL at 08:40

## 2023-10-18 RX ADMIN — APIXABAN 5 MG: 5 TABLET, FILM COATED ORAL at 20:35

## 2023-10-18 RX ADMIN — METOPROLOL SUCCINATE 50 MG: 50 TABLET, EXTENDED RELEASE ORAL at 08:40

## 2023-10-18 RX ADMIN — TRAMADOL HYDROCHLORIDE 50 MG: 50 TABLET, COATED ORAL at 20:34

## 2023-10-18 RX ADMIN — ASPIRIN 81 MG: 81 TABLET, COATED ORAL at 08:40

## 2023-10-18 RX ADMIN — TRAMADOL HYDROCHLORIDE 50 MG: 50 TABLET, COATED ORAL at 03:01

## 2023-10-18 RX ADMIN — TORSEMIDE 40 MG: 10 TABLET ORAL at 17:08

## 2023-10-18 RX ADMIN — AMLODIPINE BESYLATE 5 MG: 5 TABLET ORAL at 08:40

## 2023-10-18 ASSESSMENT — ACTIVITIES OF DAILY LIVING (ADL)
ADLS_ACUITY_SCORE: 42
ADLS_ACUITY_SCORE: 37
ADLS_ACUITY_SCORE: 42
ADLS_ACUITY_SCORE: 37
ADLS_ACUITY_SCORE: 37

## 2023-10-18 NOTE — PLAN OF CARE
ORIENTATION/NEURO: A/o x 4, neuro intact  VITALS/TELE: VSS, tele NSR with inv T wave  PAIN: c/o chronic back pain- PRN tramadol and repositioning effective for pain relief  RESP: RA, LS CTA  DIET: 2 gm Na, appetite is fair  GI/: Good UO. BM x 1. Continent of bowel/bladder  LINES/DRAINS: L PIV SL  LABS: K and Mg replaced per protocols, recheck labs in AM  SKIN: Sternal midline incision and old CT sites DANIEL. LLE harvest sites DANIEL. Scattered bruising. BLE edema improved to 1+  ASSIST/AMBULATION: Ax1, W/GB  FALL RISK: Yes  PLAN/DISPOSITION: Started on torsemide daily. Pt walked in hallway x 1 and was up to chair for meals. Minimal dyspnea on exertion. Possible discharge tomorrow.          Goal Outcome Evaluation: progressing

## 2023-10-18 NOTE — PROGRESS NOTES
[facility]    Medicine Progress Note - Hospitalist Service    Date of Admission:  10/15/2023    Assessment & Plan   Maribel Buenrostro is a 77 year old female who was just hospitalized at [facility] (9/29-10/13/23) for NSTEMI requiring 3-v CABG on 10/4 who presented to the ED at [facility] from TCU on 10/15/2023 for evaluation of shortness of breath, and was found to have a mild CHF exacerbation. Attempt to transfer to [facility] from the ED was unsuccessful due to lack of cardiac beds     Acute on chronic diastolic CHF due to possible hypertensive emergency  Recent NSTEMI s/p 3-v CABG (LIMA to LAD, SVG to ramus, SVG to diag) on 10/4/23  Elevated troponin due to recent NSTEMI  Hypertrophic cardiomyopathy  *9/29-10/13/23: Hospitalized at [facility] for evaluation of dizziness, and was found to be in a-fib/RVR. Subsequently found to have NSTEMI and ultimately underwent 3-v CABG on 10/4. At the time of discharge, she did not feel medically ready, but because vital signs were stable, she was discharged to TCU  *Meds at discharge: ASA 81 mg daily, metoprolol ER 50 mg daily, lisinopril 5 mg daily, atorvastatin 80 mg daily  *Presented on this admission (10/15) with acute shortness of breath following therapy. Hypertensive to 180s systolic on arrival; other VSS on RA. Trop 71, but was elevated to >3300 during her recent hospitalization; repeat trop 68. NTpBNP>7000 and CXR showed pulmonary edema. Recent TTE (10/6) showed EF 64%, stable severe apical LVH   *In the ED, she was initiated on IV Lasix  -- - Continue lisninopril  2 mg daily  - Cont ASA, metoprolol, and atorvastatin as previously prescribed  Lasix 60mg TID  -Monitor electrolytes magnesium and Potassium and Kidney function on diuretics    Intake/Output Summary (Last 24 hours) at 10/18/2023 1153  Last data filed at 10/18/2023 0845  Gross per 24 hour   Intake 240 ml   Output 1150 ml   Net -910 ml      # Hypomagnesemia  Mag replacment  "protcol     Prolonged QTc  QTc 508 ms on arrival  - High K and Mg protocol ordered  - Monitor on telemetry  - Repeat EKG today qtc 487   Leukocytosis   WBC 16.7. She has no localizing symptoms of infection. Suspect stress demargination from recent surgery. Last WBC 17.5 on 10/11  Wbc 14.3 on 10/1823     Paroxysmal atrial fibrillation  *Diagnosed 8/2023 at [facility]; underwent successful DCCV on 8/7  *PTA regimen: metoprolol ER 50 mg daily, amiodarone 200 mg daily, apixaban 5 mg BID  - In sinus rhythm on admission  - Cont PTA meds     Cox's esophagus  IBS  Chronic and stable on PPI prn     Alcohol use disorder  Has not had alcohol since before her recent hospitalization      # Weakness  PT and OT  -Boost supplement ordered             Diet: Combination Diet 2 gm NA Diet  Snacks/Supplements Adult: Boost Soothe; Between Meals    DVT Prophylaxis: DOAC  Navarro Catheter: Not present  Lines: None     Cardiac Monitoring: ACTIVE order. Indication: Acute decompensated heart failure (48 hours)  Code Status: No CPR- Do NOT Intubate      Clinically Significant Risk Factors                        # Hypertension: Noted on problem list        # Obesity: Estimated body mass index is 33.07 kg/m  as calculated from the following:    Height as of this encounter: 1.575 m (5' 2\").    Weight as of this encounter: 82 kg (180 lb 12.8 oz)., PRESENT ON ADMISSION       # Financial/Environmental Concerns: none         Disposition Plan      Expected Discharge Date: 10/19/2023,  3:00 PM    Destination: inpatient rehabilitation facility              Rocky Gunderson MD  Hospitalist Service  [facility]  Securely message with [employer] (more info)  Text page via [employer] Paging/Directory   ______________________________________________________________________    Interval History   Patient seen and examined at bedside.  No acute overnight events. Having mild dizziness  Still having mild shortness of breath  No " abdominal pain or chest pain   Has back pain  Has chest soreness when she coughs  Physical Exam   Vital Signs: Temp: 98.8  F (37.1  C) Temp src: Oral BP: 122/65 Pulse: 62   Resp: 18 SpO2: 92 % O2 Device: None (Room air) Oxygen Delivery: 1 LPM  Weight: 180 lbs 12.8 oz    Physical Exam  Cardiovascular:      Rate and Rhythm: Normal rate and regular rhythm.      Heart sounds: Normal heart sounds.   Pulmonary:      Effort: Pulmonary effort is normal. No respiratory distress.   Abdominal:      General: There is no distension.      Palpations: Abdomen is soft.      Tenderness: There is no abdominal tenderness.   Musculoskeletal:      Right lower leg: Edema present.      Left lower leg: Edema present.        Medical Decision Making             Data     I have personally reviewed the following data over the past 24 hrs:    14.3 (H)  \   11.7   / 369     132 (L) 92 (L) 11.7 /  95   3.6 32 (H) 0.60 \       Imaging results reviewed over the past 24 hrs:   No results found for this or any previous visit (from the past 24 hour(s)).

## 2023-10-18 NOTE — PROGRESS NOTES
[facility]  Cardiology Progress Note  Date of Service: 10/18/2023    Assessment & Plan    Maribel Buenrostro is a 77 year old female admitted on 10/15/2023 with shortness of breath, found to be in acute diastolic HF exacerbation.     Assessment:  1.  Acute diastolic heart failure - with evidence of pulmonary edema on chest x-ray and NT proBNP of 8000  2.  Hypertension   3.  Apical variant hypertrophic cardiomyopathy  4.  Paroxysmal atrial fibrillation - currently in sinus rhythm  5.  Coronary artery disease status post recent NSTEMI and three-vessel bypass surgery on 10/4/2023 at [facility].    6. Elevated troponin on admission here is likely residual from recent NSTEMI and bypass surgery.  No new anginal symptoms.  7.  Hypercholesterolemia    She has had significant symptomatic improvement since admission, no weight since 10/16. She appears close to euvolemic on exam, recommend starting PO torsemide. BMP shows stable kidney function. Blood pressure control excellent with current regimen.       Plan:   START torsemide 40mg daily  Amiodarone 200mg daily  Amlodipine 5mg daily   Apixaban 5mg BID  Aspirin 81mg daily  Lisinopril 20mg daily  Metoprolol XL 50mg daily  Strict I&O, daily weight, low sodium diet and 2L fluid restriction  Requesting to transfer her out patient cardiology follow up to our practice, will help coordinate post hospital follow up, scheduling request placed.     DESIRAE Nguyen [facility]  Pager: [phone]          Interval History   Denies chest pain. Notes some exertional dyspnea. Denies orthopnea or PND.     Physical Exam   Temp: 98.5  F (36.9  C) Temp src: Oral BP: 120/67 Pulse: 62   Resp: 18 SpO2: 92 % O2 Device: None (Room air) Oxygen Delivery: 1 LPM  Vitals:    10/15/23 0920 10/16/23 0537   Weight: 86.2 kg (190 lb) 82 kg (180 lb 12.8 oz)     GEN: well nourished, in no acute distress.  HEENT:  Pupils equal, round. Sclerae nonicteric.   NECK: Supple,  no masses appreciated. JVP hard to assess due to body habitus.   C/V:  Regular rate and rhythm, no murmur, rub or gallop.    RESP: Respirations are unlabored. Clear to auscultation bilaterally without wheezing, rales, or rhonchi.  GI: Abdomen soft, nontender.  EXTREM: Trace bilateral LE edema.  NEURO: Alert and oriented, cooperative.  SKIN: Warm and dry.     Medications      amiodarone  200 mg Oral Daily    amLODIPine  5 mg Oral Daily    apixaban ANTICOAGULANT  5 mg Oral BID    aspirin  81 mg Oral Daily    lisinopril  20 mg Oral Daily    metoprolol succinate ER  50 mg Oral Daily    sodium chloride (PF)  3 mL Intracatheter Q8H    torsemide  40 mg Oral Daily       Data   Last 24 hours labs reviewed     Echo: 10/6/23    * Limited echocardiogram performed to evaluate LV systolic function; not all   cardiac structures were fully evaluated.     * The left ventricle is normal size.     * The left ventricular systolic function is normal, quantified ejection   fraction is 64%.     * No regional wall motion abnormalities.     * Severe apical LVH.     * Compared to prior study dated 9/30/2023 no significant change in LV   function.

## 2023-10-19 ENCOUNTER — LAB REQUISITION (OUTPATIENT)
Dept: LAB | Facility: CLINIC | Age: 78
End: 2023-10-19

## 2023-10-19 ENCOUNTER — APPOINTMENT (OUTPATIENT)
Dept: PHYSICAL THERAPY | Facility: CLINIC | Age: 78
DRG: 280 | End: 2023-10-19
Attending: STUDENT IN AN ORGANIZED HEALTH CARE EDUCATION/TRAINING PROGRAM
Payer: MEDICARE

## 2023-10-19 VITALS
DIASTOLIC BLOOD PRESSURE: 68 MMHG | SYSTOLIC BLOOD PRESSURE: 104 MMHG | HEART RATE: 55 BPM | OXYGEN SATURATION: 95 % | RESPIRATION RATE: 18 BRPM | TEMPERATURE: 97.7 F

## 2023-10-19 VITALS
OXYGEN SATURATION: 98 % | SYSTOLIC BLOOD PRESSURE: 126 MMHG | BODY MASS INDEX: 32.37 KG/M2 | TEMPERATURE: 97.4 F | HEIGHT: 62 IN | DIASTOLIC BLOOD PRESSURE: 65 MMHG | RESPIRATION RATE: 20 BRPM | HEART RATE: 56 BPM | WEIGHT: 175.9 LBS

## 2023-10-19 DIAGNOSIS — Z11.1 ENCOUNTER FOR SCREENING FOR RESPIRATORY TUBERCULOSIS: ICD-10-CM

## 2023-10-19 DIAGNOSIS — Z00.01 ENCOUNTER FOR GENERAL ADULT MEDICAL EXAMINATION WITH ABNORMAL FINDINGS: ICD-10-CM

## 2023-10-19 LAB
ANION GAP SERPL CALCULATED.3IONS-SCNC: 9 MMOL/L (ref 7–15)
BUN SERPL-MCNC: 13.4 MG/DL (ref 8–23)
CALCIUM SERPL-MCNC: 8.8 MG/DL (ref 8.8–10.2)
CHLORIDE SERPL-SCNC: 92 MMOL/L (ref 98–107)
CREAT SERPL-MCNC: 0.65 MG/DL (ref 0.51–0.95)
DEPRECATED HCO3 PLAS-SCNC: 32 MMOL/L (ref 22–29)
EGFRCR SERPLBLD CKD-EPI 2021: 90 ML/MIN/1.73M2
ERYTHROCYTE [DISTWIDTH] IN BLOOD BY AUTOMATED COUNT: 14.4 % (ref 10–15)
GLUCOSE SERPL-MCNC: 102 MG/DL (ref 70–99)
HCT VFR BLD AUTO: 35.2 % (ref 35–47)
HGB BLD-MCNC: 11.8 G/DL (ref 11.7–15.7)
MAGNESIUM SERPL-MCNC: 1.8 MG/DL (ref 1.7–2.3)
MCH RBC QN AUTO: 31.5 PG (ref 26.5–33)
MCHC RBC AUTO-ENTMCNC: 33.5 G/DL (ref 31.5–36.5)
MCV RBC AUTO: 94 FL (ref 78–100)
PLATELET # BLD AUTO: 379 10E3/UL (ref 150–450)
POTASSIUM SERPL-SCNC: 3.5 MMOL/L (ref 3.4–5.3)
RBC # BLD AUTO: 3.75 10E6/UL (ref 3.8–5.2)
SODIUM SERPL-SCNC: 133 MMOL/L (ref 135–145)
WBC # BLD AUTO: 13 10E3/UL (ref 4–11)

## 2023-10-19 PROCEDURE — 250N000013 HC RX MED GY IP 250 OP 250 PS 637: Performed by: INTERNAL MEDICINE

## 2023-10-19 PROCEDURE — 85027 COMPLETE CBC AUTOMATED: CPT | Performed by: STUDENT IN AN ORGANIZED HEALTH CARE EDUCATION/TRAINING PROGRAM

## 2023-10-19 PROCEDURE — 80048 BASIC METABOLIC PNL TOTAL CA: CPT | Performed by: STUDENT IN AN ORGANIZED HEALTH CARE EDUCATION/TRAINING PROGRAM

## 2023-10-19 PROCEDURE — 99232 SBSQ HOSP IP/OBS MODERATE 35: CPT | Performed by: INTERNAL MEDICINE

## 2023-10-19 PROCEDURE — 99239 HOSP IP/OBS DSCHRG MGMT >30: CPT | Performed by: STUDENT IN AN ORGANIZED HEALTH CARE EDUCATION/TRAINING PROGRAM

## 2023-10-19 PROCEDURE — 97530 THERAPEUTIC ACTIVITIES: CPT | Mod: GP

## 2023-10-19 PROCEDURE — 83735 ASSAY OF MAGNESIUM: CPT | Performed by: INTERNAL MEDICINE

## 2023-10-19 PROCEDURE — 250N000013 HC RX MED GY IP 250 OP 250 PS 637: Performed by: NURSE PRACTITIONER

## 2023-10-19 PROCEDURE — 97161 PT EVAL LOW COMPLEX 20 MIN: CPT | Mod: GP

## 2023-10-19 PROCEDURE — 97116 GAIT TRAINING THERAPY: CPT | Mod: GP

## 2023-10-19 PROCEDURE — 36415 COLL VENOUS BLD VENIPUNCTURE: CPT | Performed by: STUDENT IN AN ORGANIZED HEALTH CARE EDUCATION/TRAINING PROGRAM

## 2023-10-19 RX ORDER — TORSEMIDE 20 MG/1
20 TABLET ORAL DAILY
DISCHARGE
Start: 2023-10-20 | End: 2023-10-30

## 2023-10-19 RX ORDER — TRAMADOL HYDROCHLORIDE 50 MG/1
50 TABLET ORAL EVERY 6 HOURS PRN
Qty: 10 TABLET | Refills: 0 | Status: SHIPPED | OUTPATIENT
Start: 2023-10-19 | End: 2023-10-23

## 2023-10-19 RX ORDER — TORSEMIDE 10 MG/1
20 TABLET ORAL DAILY
Status: DISCONTINUED | OUTPATIENT
Start: 2023-10-20 | End: 2023-10-19 | Stop reason: HOSPADM

## 2023-10-19 RX ORDER — LISINOPRIL 20 MG/1
20 TABLET ORAL DAILY
DISCHARGE
Start: 2023-10-19 | End: 2023-10-30

## 2023-10-19 RX ADMIN — APIXABAN 5 MG: 5 TABLET, FILM COATED ORAL at 08:45

## 2023-10-19 RX ADMIN — AMLODIPINE BESYLATE 5 MG: 5 TABLET ORAL at 08:46

## 2023-10-19 RX ADMIN — AMIODARONE HYDROCHLORIDE 200 MG: 200 TABLET ORAL at 08:45

## 2023-10-19 RX ADMIN — METOPROLOL SUCCINATE 50 MG: 50 TABLET, EXTENDED RELEASE ORAL at 08:44

## 2023-10-19 RX ADMIN — ASPIRIN 81 MG: 81 TABLET, COATED ORAL at 08:45

## 2023-10-19 RX ADMIN — LISINOPRIL 20 MG: 20 TABLET ORAL at 08:46

## 2023-10-19 RX ADMIN — TORSEMIDE 40 MG: 10 TABLET ORAL at 08:46

## 2023-10-19 ASSESSMENT — ACTIVITIES OF DAILY LIVING (ADL)
ADLS_ACUITY_SCORE: 37

## 2023-10-19 NOTE — PLAN OF CARE
Goal Outcome Evaluation:                 Outcome Evaluation: claims dyspnea mostly alleviated    A&O x 4. BP high to 150s SBP, otherwise VSS on RA. Tele: SR w/ qt prolongation. Assistx1 w/ sternal precautions. 2g Na diet, tolerating well. PIV to L forearm, SL. Pt endorses SOB and ROWLAND, denies pain. Continent of B&B, purewick in place for overnight diuresis. No BM this shift. Continue plan of care.

## 2023-10-19 NOTE — PROGRESS NOTES
Occupational Therapy: Orders received. Chart reviewed and discussed with care team.? Occupational Therapy not indicated due to planned discharge to TCU.? Defer discharge recommendations to treatment team.  Recommend OT evaluation at TCU.? Will complete orders.

## 2023-10-19 NOTE — PROGRESS NOTES
Assessment and Plan:     Assessment & Plan  Maribel Buenrostro is a 77 year old female admitted on 10/15/2023 with shortness of breath, found to be in acute diastolic HF exacerbation.     Assessment:  1.  Acute diastolic heart failure - with evidence of pulmonary edema on chest x-ray and NT proBNP of 8000  2.  Hypertension   3.  Apical variant hypertrophic cardiomyopathy, causing diastolic dysfunction and heart failure  4.  Paroxysmal atrial fibrillation - currently in sinus rhythm, maintained on amiodarone for rhythm control and Eliquis for stroke prevention  5.  Coronary artery disease status post recent NSTEMI and three-vessel bypass surgery on 10/4/2023 at [facility].    6. Elevated troponin on admission here is likely residual from recent NSTEMI and bypass surgery.  No new anginal symptoms.  7.  Hypercholesterolemia     She is doing very well now with significant fluid removal during this hospitalization, 4.5 L net output, reports improvement in breathing and lower extremity edema issues.  Blood pressures are under much better control now.  She has not had recurrent atrial fibrillation issues.  No further chest pain or other ischemic symptoms.        Plan:   Torsemide 20 mg daily  Amiodarone 200mg daily  Amlodipine 5mg daily   Apixaban 5mg BID  Aspirin 81mg daily  Lisinopril 20mg daily  Metoprolol XL 50mg daily  Strict I&O, daily weight, low sodium diet and 2L fluid restriction      We will have her follow-up in cardiology clinic in 2 to 4 weeks.  I will plan to see her back as well in 6-8 weeks if possible.       AUBREE BOSCH MD        Interval History:     doing well; no cp, sob, n/v/d, or abd pain.          Medications:      amiodarone  200 mg Oral Daily    amLODIPine  5 mg Oral Daily    apixaban ANTICOAGULANT  5 mg Oral BID    aspirin  81 mg Oral Daily    lisinopril  20 mg Oral Daily    metoprolol succinate ER  50 mg Oral Daily    sodium chloride (PF)  3 mL Intracatheter Q8H    [START ON  "10/20/2023] torsemide  20 mg Oral Daily            Physical Exam:   Blood pressure 126/65, pulse 56, temperature 97.4  F (36.3  C), temperature source Oral, resp. rate 20, height 1.575 m (5' 2\"), weight 79.8 kg (175 lb 14.4 oz), SpO2 98%.  Vitals:    10/15/23 0920 10/16/23 0537 10/19/23 0530   Weight: 86.2 kg (190 lb) 82 kg (180 lb 12.8 oz) 79.8 kg (175 lb 14.4 oz)         Intake/Output Summary (Last 24 hours) at 10/19/2023 1019  Last data filed at 10/19/2023 0530  Gross per 24 hour   Intake --   Output 650 ml   Net -650 ml       10/14 0700 - 10/19 0659  In: 480 [P.O.:480]  Out: 4950 [Urine:4950]  Net: -4470    Exam:  GENERAL APPEARANCE ADULT: Alert, in no acute distress  RESP: lungs clear to auscultation   CV: regular rate and rhythm, no murmur, rub, or gallop  ABDOMEN: normal bowel sounds, soft, nontender, no hepatosplenomegaly or other masses  EXTREMITIES: No edema         Data:   LABS (Last four results)  CMP  Recent Labs   Lab 10/19/23  0549 10/18/23  0543 10/18/23  0531 10/17/23  0538 10/16/23  0542   * 132*  --  134* 134*   POTASSIUM 3.5 3.6  --  3.7 3.7   CHLORIDE 92* 92*  --  92* 93*   CO2 32* 32*  --  32* 30*   ANIONGAP 9 8  --  10 11   * 95 97 113* 108*   BUN 13.4 11.7  --  8.9 10.1   CR 0.65 0.60  --  0.60 0.57   GFRESTIMATED 90 >90  --  >90 >90   JUAN 8.8 8.7*  --  9.0 9.1   MAG 1.8 1.8  --  1.7 1.8     CBC  Recent Labs   Lab 10/19/23  0549 10/18/23  0543 10/16/23  0542 10/15/23  0944   WBC 13.0* 14.3* 16.8* 16.7*   RBC 3.75* 3.70* 3.75* 3.81   HGB 11.8 11.7 11.8 12.1   HCT 35.2 34.5* 34.8* 35.9   MCV 94 93 93 94   MCH 31.5 31.6 31.5 31.8   MCHC 33.5 33.9 33.9 33.7   RDW 14.4 14.4 14.2 14.2    [phone]     INRNo lab results found in last 7 days.  TROPONINS No results found for: \"TROPI\", \"TROPONIN\", \"TROPR\", \"TROPN\"                                                                                                            AUBREE BOSCH MD   "

## 2023-10-19 NOTE — PROGRESS NOTES
Care Management Discharge Note    Discharge Date: 10/19/2023       Discharge Disposition: Transitional Care    Discharge Services: None    Discharge DME: None    Discharge Transportation: family or friend will provide    Private pay costs discussed: private room/amenity fees    Does the patient's insurance plan have a 3 day qualifying hospital stay waiver?  No    PAS Confirmation Code: [id number]  Patient/family educated on Medicare website which has current facility and service quality ratings: yes    Education Provided on the Discharge Plan: Yes  Persons Notified of Discharge Plans: Pt, RN, MD, facility  Patient/Family in Agreement with the Plan: yes    Handoff Referral Completed: No    Additional Information:  Pt will discharge today to [facility], via son, at 1300. It is a private room and pt understands she will be billed $45 per day. Orders faxed and facility updated. Pt in agreement. Nursing aware.     MINA Leon, [facility]  [phone] Desk phone  [phone] Cell/text (Preferred)  [facility]    PAS-RR    D: Per DHS regulation, SW completed and submitted PAS-RR to [place] [employer] Direct Connect via the Senior LinkAge Line.  PAS-RR confirmation # is : [id number]      P: Further questions may be directed to [facility] LinkAge Line at #[phone], option #4 for PAS-RR staff.

## 2023-10-19 NOTE — PLAN OF CARE
Physical Therapy Discharge Summary    Reason for therapy discharge:    Discharged to transitional care facility.    Progress towards therapy goal(s). See [facility] on Care [facility] for goal details.  Goals partially met.  Barriers to achieving goals:   discharge from facility.    Therapy recommendation(s):    Continued therapy is recommended.  Rationale/Recommendations:  recommending continued skilled therapies at TCU for improvement of strength, activity tolerance, and independence with functional mobility.

## 2023-10-19 NOTE — PROGRESS NOTES
[facility] GERIATRICS    PRIMARY CARE PROVIDER AND CLINIC:  [facility], [address] [facility] / Located within [facility] [address]  Chief Complaint   Patient presents with    Hospital F/U      [place] Medical Record Number:  [id number]  Place of Service where encounter took place:  [facility] (TCU) [[id number]]    Maribel Buenrostro  is a 77 year old  (1945), admitted to the above facility from  [facility]. Hospital stay 10/15/23 through 10/19/23..   HPI:    Maribel Buenrostro is a 77 year old female who was recently hospitalized at [facility] (9/29-10/13/23) for A-fib RVR and subsequently NSTEMI requiring 3V CABG on 10/4.  She was discharged to TCU on 10/13/2023.  Presented to [facility] ED from TCU on 10/15/2023 for evaluation of shortness of breath, and was found to have a mild CHF exacerbation.  Chest x-ray showed small bilateral pleural effusions and mild interstitial pulmonary edema.  She was diuresed with IV Lasix.  TCU per therapy.    Seen today for initial TCU visit.  Asks to be DNR. Denies headaches, chest pain, palpitations, abdominal pain, nausea, bowel or bladder issues.  Reports fatigue and dyspnea on exertion. Reports chronic back pain. BP range 104-112/64-72, sats 93% on room air.    CODE STATUS/ADVANCE DIRECTIVES DISCUSSION:  No CPR- Pre-arrest intubation OK    ALLERGIES: No Known Allergies   PAST MEDICAL HISTORY:   Past Medical History:   Diagnosis Date    Cox's esophagus     CAD (coronary artery disease)     s/p 3-v CABG (LIMA to LAD, SVG to ramus, SVG to diag) on 10/4/23    Hypertrophic cardiomyopathy (H)     Irritable bowel syndrome     Paroxysmal atrial fibrillation (H)       PAST SURGICAL HISTORY:   has a past surgical history that includes CABG, ARTERY-VEIN, THREE.  FAMILY HISTORY: family history is not on file.  SOCIAL HISTORY:     Patient's living condition: lives with family, DAUGHTER     Post Discharge Medication Reconciliation Status:   MED  REC REQUIRED  Post Medication Reconciliation Status: discharge medications reconciled and changed, per note/orders       Current Outpatient Medications   Medication Sig    acetaminophen (TYLENOL) 500 MG tablet Take 1,000 mg by mouth every 6 hours as needed for mild pain    amiodarone (PACERONE) 200 MG tablet Take 200 mg by mouth daily    apixaban ANTICOAGULANT (ELIQUIS) 5 MG tablet Take 5 mg by mouth 2 times daily    aspirin 81 MG EC tablet Take 81 mg by mouth daily    atorvastatin (LIPITOR) 80 MG tablet Take 80 mg by mouth daily    bisacodyl (DULCOLAX) 5 MG EC tablet Take 5 mg by mouth daily as needed for constipation    famotidine (PEPCID) 20 MG tablet Take 20 mg by mouth 2 times daily    lisinopril (ZESTRIL) 20 MG tablet Take 1 tablet (20 mg) by mouth daily    metoprolol succinate ER (TOPROL XL) 25 MG 24 hr tablet Take 50 mg by mouth daily    omeprazole (PRILOSEC OTC) 20 MG EC tablet Take 20 mg by mouth daily as needed (symptoms of GERD)    ondansetron (ZOFRAN ODT) 4 MG ODT tab Take 4 mg by mouth every 8 hours as needed for nausea    polyethylene glycol (MIRALAX) 17 g packet Take 1 packet by mouth daily    potassium chloride ER (KLOR-CON M) 10 MEQ CR tablet Take 10 mEq by mouth daily    senna-docusate (SENOKOT-S/PERICOLACE) 8.6-50 MG tablet Take 1 tablet by mouth daily as needed for constipation (1-2) at bedtime    torsemide (DEMADEX) 20 MG tablet Take 1 tablet (20 mg) by mouth daily    traMADol (ULTRAM) 50 MG tablet Take 1 tablet (50 mg) by mouth every 6 hours as needed for severe pain    vitamin B-Complex Take 1 tablet by mouth daily    Vitamin D3 (CHOLECALCIFEROL) 25 mcg (1000 units) tablet Take 25 mcg by mouth daily     No current facility-administered medications for this visit.       ROS:  10 point ROS of systems including Constitutional, Eyes, Respiratory, Cardiovascular, Gastroenterology, Genitourinary, Integumentary, Musculoskeletal, Psychiatric were all negative except for pertinent positives noted in  my HPI.    Vitals:  /68   Pulse 55   Temp 97.7  F (36.5  C)   Resp 18   SpO2 95%   Exam:  GENERAL APPEARANCE:  Alert, in no distress, pleasant, cooperative, oriented x 4  EYES:  EOM, lids, pupils and irises normal, sclera clear and conjunctiva normal, no discharge or mattering on lids or lashes noted  ENT:  Mouth normal, moist mucous membranes, nose normal without drainage or crusting, external ears without lesions, hearing acuity intact  NECK: supple, symmetrical, trachea midline  RESP:  respiratory effort normal, no chest wall tenderness, no respiratory distress, Lung sounds clear, patient is on RA  CV:  Auscultation of heart done, rate and rhythm controlled and regular, no murmur, no rub or gallop. Edema trace bilateral lower extremities  ABDOMEN:  normal bowel sounds, soft, nontender, no palpable masses.  M/S:   Gait and station walks with assist , no tenderness or swelling of the joints; able to move all extremities, digits normal  SKIN:  Inspection and palpation of skin and subcutaneous tissue: midline chest incision open to air, healing  NEURO: cranial nerves 2-12 grossly intact, no facial asymmetry, no speech deficits and able to follow directions, moves all extremities symmetrically  PSYCH:  insight and judgement and memory intact, affect and mood normal     Lab/Diagnostic data:  Most Recent 3 CBC's:  Recent Labs   Lab Test 10/19/23  0549 10/18/23  0543 10/16/23  0542   WBC 13.0* 14.3* 16.8*   HGB 11.8 11.7 11.8   MCV 94 93 93    369 387     Most Recent 3 BMP's:  Recent Labs   Lab Test 10/19/23  0549 10/18/23  0543 10/18/23  0531 10/17/23  0538   * 132*  --  134*   POTASSIUM 3.5 3.6  --  3.7   CHLORIDE 92* 92*  --  92*   CO2 32* 32*  --  32*   BUN 13.4 11.7  --  8.9   CR 0.65 0.60  --  0.60   ANIONGAP 9 8  --  10   JUAN 8.8 8.7*  --  9.0   * 95 97 113*         ASSESSMENT/PLAN:  Acute diastolic congestive heart failure (H)  Postoperative heart failure following cardiac  surgery  S/p CABG  Atrial fibrillation with rapid ventricular response (H)  Hypertension, unspecified type  Dyspnea, unspecified type  Acute on chronic, recent surgery. Continue torsemide 20 mg once daily, tramadol 50 mg every 6 hours as needed, Tylenol 1000 mg every 6 hours as needed, atorvastatin 80 mg once daily. Increase KCl to 20 mEq once daily. Continue amiodarone 200 mg once daily, Eliquis 5 mg twice daily, aspirin 81 mg once daily, metoprolol 50 mg once daily. Last WBC 13 on 10/19, check CBC 10/23. Na 133 on 10/19, recheck BMP on 10/23. Monitor vs and wt. Cardiology/surgeon f/u as recommended.     Cox's esophagus with dysplasia  Nausea  Chronic, stable. Continue famotidine 20 mg twice daily and Zofran 4 mg every 8 hours as needed.     Constipation  No symptoms. Senna S available PRN. Monitor.     Physical deconditioning  Acute, ongoing. Therapies as ordered and f/u progress    Advanced directive, counseling/discussion  Asks to be DNR    Orders:  DNR  CBC and BMP 10/23 diagnosis leukocytosis, HF  Increase KCL to 20 meq PO daily diagnosis hypokalemia    Electronically signed by:  DESIRAE Vizcaino CNP

## 2023-10-20 ENCOUNTER — TRANSITIONAL CARE UNIT VISIT (OUTPATIENT)
Dept: GERIATRICS | Facility: CLINIC | Age: 78
End: 2023-10-20
Payer: MEDICARE

## 2023-10-20 DIAGNOSIS — K59.01 SLOW TRANSIT CONSTIPATION: ICD-10-CM

## 2023-10-20 DIAGNOSIS — I48.91 ATRIAL FIBRILLATION WITH RAPID VENTRICULAR RESPONSE (H): ICD-10-CM

## 2023-10-20 DIAGNOSIS — I10 HYPERTENSION, UNSPECIFIED TYPE: ICD-10-CM

## 2023-10-20 DIAGNOSIS — R06.00 DYSPNEA, UNSPECIFIED TYPE: ICD-10-CM

## 2023-10-20 DIAGNOSIS — Z95.1 S/P CABG (CORONARY ARTERY BYPASS GRAFT): ICD-10-CM

## 2023-10-20 DIAGNOSIS — Z71.89 ADVANCED DIRECTIVES, COUNSELING/DISCUSSION: ICD-10-CM

## 2023-10-20 DIAGNOSIS — K22.719 BARRETT'S ESOPHAGUS WITH DYSPLASIA: ICD-10-CM

## 2023-10-20 DIAGNOSIS — I50.31 ACUTE DIASTOLIC CONGESTIVE HEART FAILURE (H): Primary | ICD-10-CM

## 2023-10-20 DIAGNOSIS — I97.130: ICD-10-CM

## 2023-10-20 DIAGNOSIS — R11.0 NAUSEA: ICD-10-CM

## 2023-10-20 DIAGNOSIS — R53.81 PHYSICAL DECONDITIONING: ICD-10-CM

## 2023-10-20 PROCEDURE — 86481 TB AG RESPONSE T-CELL SUSP: CPT | Performed by: NURSE PRACTITIONER

## 2023-10-20 PROCEDURE — 36415 COLL VENOUS BLD VENIPUNCTURE: CPT | Performed by: NURSE PRACTITIONER

## 2023-10-20 PROCEDURE — 99309 SBSQ NF CARE MODERATE MDM 30: CPT | Performed by: NURSE PRACTITIONER

## 2023-10-21 LAB
GAMMA INTERFERON BACKGROUND BLD IA-ACNC: 0.02 IU/ML
M TB IFN-G BLD-IMP: NEGATIVE
M TB IFN-G CD4+ BCKGRND COR BLD-ACNC: 6.96 IU/ML
MITOGEN IGNF BCKGRD COR BLD-ACNC: 0.01 IU/ML
MITOGEN IGNF BCKGRD COR BLD-ACNC: 0.02 IU/ML
QUANTIFERON MITOGEN: 6.98 IU/ML
QUANTIFERON NIL TUBE: 0.02 IU/ML
QUANTIFERON TB1 TUBE: 0.03 IU/ML
QUANTIFERON TB2 TUBE: 0.04

## 2023-10-22 ENCOUNTER — DOCUMENTATION ONLY (OUTPATIENT)
Dept: OTHER | Facility: CLINIC | Age: 78
End: 2023-10-22
Payer: MEDICARE

## 2023-10-22 ENCOUNTER — LAB REQUISITION (OUTPATIENT)
Dept: LAB | Facility: CLINIC | Age: 78
End: 2023-10-22

## 2023-10-22 DIAGNOSIS — E87.1 HYPO-OSMOLALITY AND HYPONATREMIA: ICD-10-CM

## 2023-10-23 ENCOUNTER — TRANSITIONAL CARE UNIT VISIT (OUTPATIENT)
Dept: GERIATRICS | Facility: CLINIC | Age: 78
End: 2023-10-23
Payer: MEDICARE

## 2023-10-23 VITALS
BODY MASS INDEX: 32.22 KG/M2 | TEMPERATURE: 97.4 F | SYSTOLIC BLOOD PRESSURE: 117 MMHG | RESPIRATION RATE: 18 BRPM | WEIGHT: 175.1 LBS | HEIGHT: 62 IN | OXYGEN SATURATION: 93 % | DIASTOLIC BLOOD PRESSURE: 79 MMHG | HEART RATE: 56 BPM

## 2023-10-23 DIAGNOSIS — K59.01 SLOW TRANSIT CONSTIPATION: ICD-10-CM

## 2023-10-23 DIAGNOSIS — R11.0 NAUSEA: ICD-10-CM

## 2023-10-23 DIAGNOSIS — R53.81 PHYSICAL DECONDITIONING: ICD-10-CM

## 2023-10-23 DIAGNOSIS — I97.130: ICD-10-CM

## 2023-10-23 DIAGNOSIS — I48.91 ATRIAL FIBRILLATION WITH RAPID VENTRICULAR RESPONSE (H): ICD-10-CM

## 2023-10-23 DIAGNOSIS — K22.719 BARRETT'S ESOPHAGUS WITH DYSPLASIA: ICD-10-CM

## 2023-10-23 DIAGNOSIS — Z95.1 S/P CABG (CORONARY ARTERY BYPASS GRAFT): ICD-10-CM

## 2023-10-23 DIAGNOSIS — I10 HYPERTENSION, UNSPECIFIED TYPE: ICD-10-CM

## 2023-10-23 DIAGNOSIS — I50.31 ACUTE DIASTOLIC CONGESTIVE HEART FAILURE (H): Primary | ICD-10-CM

## 2023-10-23 DIAGNOSIS — R06.00 DYSPNEA, UNSPECIFIED TYPE: ICD-10-CM

## 2023-10-23 DIAGNOSIS — R21 RASH: ICD-10-CM

## 2023-10-23 LAB
ANION GAP SERPL CALCULATED.3IONS-SCNC: 12 MMOL/L (ref 7–15)
BUN SERPL-MCNC: 22.5 MG/DL (ref 8–23)
CALCIUM SERPL-MCNC: 9.7 MG/DL (ref 8.8–10.2)
CHLORIDE SERPL-SCNC: 94 MMOL/L (ref 98–107)
CREAT SERPL-MCNC: 0.9 MG/DL (ref 0.51–0.95)
DEPRECATED HCO3 PLAS-SCNC: 31 MMOL/L (ref 22–29)
EGFRCR SERPLBLD CKD-EPI 2021: 65 ML/MIN/1.73M2
ERYTHROCYTE [DISTWIDTH] IN BLOOD BY AUTOMATED COUNT: 14.6 % (ref 10–15)
GLUCOSE SERPL-MCNC: 102 MG/DL (ref 70–99)
HCT VFR BLD AUTO: 36.6 % (ref 35–47)
HGB BLD-MCNC: 11.6 G/DL (ref 11.7–15.7)
MCH RBC QN AUTO: 30.9 PG (ref 26.5–33)
MCHC RBC AUTO-ENTMCNC: 31.7 G/DL (ref 31.5–36.5)
MCV RBC AUTO: 98 FL (ref 78–100)
PLATELET # BLD AUTO: 342 10E3/UL (ref 150–450)
POTASSIUM SERPL-SCNC: 3.9 MMOL/L (ref 3.4–5.3)
RBC # BLD AUTO: 3.75 10E6/UL (ref 3.8–5.2)
SODIUM SERPL-SCNC: 137 MMOL/L (ref 135–145)
WBC # BLD AUTO: 8.7 10E3/UL (ref 4–11)

## 2023-10-23 PROCEDURE — 80048 BASIC METABOLIC PNL TOTAL CA: CPT | Performed by: NURSE PRACTITIONER

## 2023-10-23 PROCEDURE — P9604 ONE-WAY ALLOW PRORATED TRIP: HCPCS | Performed by: NURSE PRACTITIONER

## 2023-10-23 PROCEDURE — 85027 COMPLETE CBC AUTOMATED: CPT | Performed by: NURSE PRACTITIONER

## 2023-10-23 PROCEDURE — 36415 COLL VENOUS BLD VENIPUNCTURE: CPT | Performed by: NURSE PRACTITIONER

## 2023-10-23 PROCEDURE — 99309 SBSQ NF CARE MODERATE MDM 30: CPT | Performed by: NURSE PRACTITIONER

## 2023-10-23 RX ORDER — TRAMADOL HYDROCHLORIDE 50 MG/1
50 TABLET ORAL EVERY 6 HOURS PRN
Qty: 20 TABLET | Refills: 0 | Status: SHIPPED | OUTPATIENT
Start: 2023-10-23

## 2023-10-23 NOTE — DISCHARGE SUMMARY
"[facility]  Hospitalist Discharge Summary      Date of Admission:  10/15/2023  Date of Discharge:  10/19/2023  1:13 PM  Discharging Provider: Rocky Gunderson MD  Discharge Service: Hospitalist Service    Discharge Diagnoses      Acute on chronic diastolic CHF due to possible hypertensive emergency  Recent NSTEMI s/p 3-v CABG (LIMA to LAD, SVG to ramus, SVG to diag) on 10/4/23  Elevated troponin due to recent NSTEMI  Hypertrophic cardiomyopathy    Clinically Significant Risk Factors     # Obesity: Estimated body mass index is 32.17 kg/m  as calculated from the following:    Height as of this encounter: 1.575 m (5' 2\").    Weight as of this encounter: 79.8 kg (175 lb 14.4 oz).       Follow-ups Needed After Discharge   Follow-up Appointments     Follow Up and recommended labs and tests      Follow up with Nursing home physician.  Repeat Bmp in 3 days    Follow with Cardiology and Dr Kelly's office will set up her appointment    Follow with CT surgery in 2 weeks            Unresulted Labs Ordered in the Past 30 Days of this Admission       No orders found from 9/15/2023 to 10/16/2023.        These results will be followed up by     Discharge Disposition   Discharged to short-term care facility  Condition at discharge: Stable    Hospital Course      Acute on chronic diastolic CHF due to possible hypertensive emergency  Recent NSTEMI s/p 3-v CABG (LIMA to LAD, SVG to ramus, SVG to diag) on 10/4/23  Elevated troponin due to recent NSTEMI  Hypertrophic cardiomyopathy  *9/29-10/13/23: Hospitalized at [facility] for evaluation of dizziness, and was found to be in a-fib/RVR. Subsequently found to have NSTEMI and ultimately underwent 3-v CABG on 10/4. At the time of discharge, she did not feel medically ready, but because vital signs were stable, she was discharged to TCU  *Meds at discharge: ASA 81 mg daily, metoprolol ER 50 mg daily, lisinopril 5 mg daily, atorvastatin 80 mg " daily  *Presented on this admission (10/15) with acute shortness of breath following therapy. Hypertensive to 180s systolic on arrival; other VSS on RA. Trop 71, but was elevated to >3300 during her recent hospitalization; repeat trop 68. NTpBNP>7000 and CXR showed pulmonary edema. Recent TTE (10/6) showed EF 64%, stable severe apical LVH   *In the ED, she was initiated on IV Lasix  -- - Continue lisninopril  2 mg daily  - Cont ASA, metoprolol, and atorvastatin as previously prescribed   Was diuresed with Lasix 60mg TID.significant fluid removal during this hospitalization,Improvement of shortness of breath and edeam  -Monitor electrolytes magnesium and Potassium and Kidney function on diuretics  -Repeat Bmp at TCU  -Discharged on Torsemide 20mg,   Amiodarone 200mg daily  Amlodipine 5mg daily   Apixaban 5mg BID  Aspirin 81mg daily  Lisinopril 20mg daily  Metoprolol XL 50mg daily  follow-up in cardiology clinic in 2 to 4 weeks    daily weight, low sodium diet and 2L fluid restriction         # Hypomagnesemia  Mag replacment protcol     Prolonged QTc  QTc 508 ms on arrival  - High K and Mg protocol ordered  - Monitor on telemetry  - Repeat EKG qtc 487   Leukocytosis   WBC 16.7. She has no localizing symptoms of infection. Suspect stress demargination from recent surgery. Last WBC 17.5 on 10/11  Wbc 14.3 on 10/1823     Paroxysmal atrial fibrillation  *Diagnosed 8/2023 at [facility]; underwent successful DCCV on 8/7  *PTA regimen: metoprolol ER 50 mg daily, amiodarone 200 mg daily, apixaban 5 mg BID  - In sinus rhythm on admission  - Cont PTA meds     Cox's esophagus  IBS  Chronic and stable on PPI prn     Alcohol use disorder  Has not had alcohol since before her recent hospitalization      # Hyponatremia Mild  Continue to monitor      Consultations This Hospital Stay   CARE MANAGEMENT / SOCIAL WORK IP CONSULT  CARDIOLOGY IP CONSULT  PHYSICAL THERAPY ADULT IP CONSULT  OCCUPATIONAL THERAPY ADULT IP  CONSULT  PHYSICAL THERAPY ADULT IP CONSULT  OCCUPATIONAL THERAPY ADULT IP CONSULT    Code Status   No CPR- Do NOT Intubate    Time Spent on this Encounter   I, Rocky Gunderson MD, personally saw the patient today and spent greater than 30 minutes discharging this patient.       Rocky Gunderson MD  [place] [address] [address] [place] [address]  Phone: [phone]  ______________________________________________________________________    Physical Exam   Vital Signs:                    Weight: 175 lbs 14.4 oz  Physical Exam  Cardiovascular:      Rate and Rhythm: Normal rate and regular rhythm.      Heart sounds: Normal heart sounds.   Pulmonary:      Effort: No respiratory distress.      Breath sounds: Normal breath sounds. No wheezing or rales.   Abdominal:      General: There is no distension.      Palpations: Abdomen is soft.      Tenderness: There is no abdominal tenderness.   Musculoskeletal:      Comments: Mild bilateral pitting edema             Primary Care Physician   [facility]    Discharge Orders      Basic metabolic panel     Follow-Up with Cardiology [place]      General info for SNF    Length of Stay Estimate: Short Term Care: Estimated # of Days <30  Condition at Discharge: Improving  Level of care:skilled   Rehabilitation Potential: Fair  Admission H&P remains valid and up-to-date: Yes  Recent Chemotherapy: N/A  Use Nursing Home Standing Orders: Yes     Mantoux instructions    Give two-step Mantoux (PPD) Per Facility Policy Yes     Follow Up and recommended labs and tests    Follow up with Nursing home physician.  Repeat Bmp in 3 days    Follow with Cardiology and Dr Kelly's office will set up her appointment    Follow with CT surgery in 2 weeks     Reason for your hospital stay    Acute congestive Heart failure     Intake and output    Every shift     Daily weights    Call Provider for weight gain of more than 2 pounds per day  or 5 pounds per week.     Activity - Up with nursing assistance     No CPR- Do NOT Intubate     Physical Therapy Adult Consult    Evaluate and treat as clinically indicated.     Occupational Therapy Adult Consult    Evaluate and treat as clinically indicated     Fall precautions     Diet    Follow this diet upon discharge: Orders Placed This Encounter      Snacks/Supplements Adult: Boost Soothe; Between Meals      Combination Diet 2 gm NA Diet       Significant Results and Procedures   Most Recent 3 CBC's:  Recent Labs   Lab Test 10/19/23  0549 10/18/23  0543 10/16/23  0542   WBC 13.0* 14.3* 16.8*   HGB 11.8 11.7 11.8   MCV 94 93 93    369 387     Most Recent 3 BMP's:  Recent Labs   Lab Test 10/19/23  0549 10/18/23  0543 10/18/23  0531 10/17/23  0538   * 132*  --  134*   POTASSIUM 3.5 3.6  --  3.7   CHLORIDE 92* 92*  --  92*   CO2 32* 32*  --  32*   BUN 13.4 11.7  --  8.9   CR 0.65 0.60  --  0.60   ANIONGAP 9 8  --  10   JUAN 8.8 8.7*  --  9.0   * 95 97 113*   ,   Results for orders placed or performed during the hospital encounter of 10/15/23   XR Chest 2 Views    Narrative    EXAM: XR CHEST 2 VIEWS  LOCATION: [facility]  DATE: 10/15/2023    INDICATION: sob  COMPARISON: 8/4/2023      Impression    IMPRESSION: Low lung volumes with small bilateral pleural effusions and associated airspace opacities. Pulmonary vascular crowding with suggestion of mild interstitial pulmonary edema.    Unchanged enlarged cardiomediastinal silhouette with sternotomy wires.    Degenerative changes in the shoulders with old fracture deformity of the left humerus.       Discharge Medications   Discharge Medication List as of 10/19/2023 11:50 AM        START taking these medications    Details   torsemide (DEMADEX) 20 MG tablet Take 1 tablet (20 mg) by mouth daily, Transitional           CONTINUE these medications which have CHANGED    Details   lisinopril (ZESTRIL) 20 MG tablet Take 1 tablet (20  mg) by mouth daily, Transitional      traMADol (ULTRAM) 50 MG tablet Take 1 tablet (50 mg) by mouth every 6 hours as needed for severe pain, Disp-10 tablet, R-0, Local Print           CONTINUE these medications which have NOT CHANGED    Details   acetaminophen (TYLENOL) 500 MG tablet Take 1,000 mg by mouth every 6 hours as needed for mild pain, Historical      amiodarone (PACERONE) 200 MG tablet Take 200 mg by mouth daily, Historical      apixaban ANTICOAGULANT (ELIQUIS) 5 MG tablet Take 5 mg by mouth 2 times daily, Historical      aspirin 81 MG EC tablet Take 81 mg by mouth daily, Historical      atorvastatin (LIPITOR) 80 MG tablet Take 80 mg by mouth daily, Historical      bisacodyl (DULCOLAX) 5 MG EC tablet Take 5 mg by mouth daily as needed for constipation, Historical      famotidine (PEPCID) 20 MG tablet Take 20 mg by mouth 2 times daily, Historical      metoprolol succinate ER (TOPROL XL) 25 MG 24 hr tablet Take 50 mg by mouth daily, Historical      omeprazole (PRILOSEC OTC) 20 MG EC tablet Take 20 mg by mouth daily as needed (symptoms of GERD), Historical      ondansetron (ZOFRAN ODT) 4 MG ODT tab Take 4 mg by mouth every 8 hours as needed for nausea, Historical      polyethylene glycol (MIRALAX) 17 g packet Take 1 packet by mouth daily, Historical      potassium chloride ER (KLOR-CON M) 10 MEQ CR tablet Take 10 mEq by mouth daily, Historical      senna-docusate (SENOKOT-S/PERICOLACE) 8.6-50 MG tablet Take 1 tablet by mouth daily as needed for constipation (1-2) at bedtime, Historical      vitamin B-Complex Take 1 tablet by mouth daily, Historical      Vitamin D3 (CHOLECALCIFEROL) 25 mcg (1000 units) tablet Take 25 mcg by mouth daily, Historical           STOP taking these medications       furosemide (LASIX) 20 MG tablet Comments:   Reason for Stopping:         HYDROmorphone (DILAUDID) 2 MG tablet Comments:   Reason for Stopping:         hydrOXYzine (ATARAX) 25 MG tablet Comments:   Reason for Stopping:          multivitamin w/minerals (THERA-VIT-M) tablet Comments:   Reason for Stopping:             Allergies   No Known Allergies

## 2023-10-23 NOTE — PROGRESS NOTES
"[facility] GERIATRICS    Chief Complaint   Patient presents with    RECHECK     HPI:  Maribel Buenrostro is a 77 year old  (1945), who is being seen today for an episodic care visit at: [facility] (TCU) [[id number]].     Per recent TCU provider progress notes:   77 year old female who was recently hospitalized at [facility] (9/29-10/13/23) for A-fib RVR and subsequently NSTEMI requiring 3V CABG on 10/4.  She was discharged to TCU on 10/13/2023.  Presented to [facility] ED from TCU on 10/15/2023 for evaluation of shortness of breath, and was found to have a mild CHF exacerbation.  Chest x-ray showed small bilateral pleural effusions and mild interstitial pulmonary edema.  She was diuresed with IV Lasix.  TCU for rehab.     Today's concern is: episodic f/u pain, rash, mobility and vs. Staff note flat red rash on patient's mid back. Somewhat itchy, have been using house order HC cream with relief. No headaches, dizziness, chest pain, dyspnea, bowel or bladder issues. Fatigued. Up in room air 2WW. BP range /53-79 and sats 93% room air. HR 50-60s, will decrease BB dose.     Allergies, and PMH/PSH reviewed in [facility] today.  REVIEW OF SYSTEMS:  4 point ROS including Respiratory, CV, GI and , other than that noted in the HPI,  is negative    Objective:   /79   Pulse 56   Temp 97.4  F (36.3  C)   Resp 18   Ht 1.575 m (5' 2\")   Wt 79.4 kg (175 lb 1.6 oz)   SpO2 93%   BMI 32.03 kg/m    GENERAL APPEARANCE:  Alert, in no distress, pleasant, cooperative, oriented x self, place and recent events  EYES:  EOM, lids, pupils and irises normal, sclera clear and conjunctiva normal, no discharge or mattering on lids or lashes noted  ENT:  Mouth normal, moist mucous membranes, nose normal without drainage or crusting, external ears without lesions, hearing acuity intact  NECK: supple, symmetrical, trachea midline  RESP:  respiratory effort normal, no chest wall tenderness, no respiratory distress, Lung " sounds clear, patient is on room air  CV:  Auscultation of heart done, rate and rhythm controlled and regular, no murmur, no rub or gallop. Edema none bilateral lower extremities  ABDOMEN:  normal bowel sounds, soft, nontender, no palpable masses.  M/S:   Gait and station unsafe without assistance, no tenderness or swelling of the joints; able to move all extremities, digits normal  SKIN:  Inspection and palpation of skin and subcutaneous tissue: midline chest incision intact, no redness, open to air  NEURO: cranial nerves 2-12 grossly intact, no facial asymmetry, no speech deficits and able to follow directions, moves all extremities symmetrically  PSYCH:  insight and judgement and memory appear at baseline, affect and mood normal     Most Recent 3 CBC's:  Recent Labs   Lab Test 10/23/23  0818 10/19/23  0549 10/18/23  0543   WBC 8.7 13.0* 14.3*   HGB 11.6* 11.8 11.7   MCV 98 94 93    379 369     Most Recent 3 BMP's:  Recent Labs   Lab Test 10/23/23  0818 10/19/23  0549 10/18/23  0543    133* 132*   POTASSIUM 3.9 3.5 3.6   CHLORIDE 94* 92* 92*   CO2 31* 32* 32*   BUN 22.5 13.4 11.7   CR 0.90 0.65 0.60   ANIONGAP 12 9 8   JUAN 9.7 8.8 8.7*   * 102* 95       Assessment/Plan:  Acute diastolic congestive heart failure (H)  Postoperative heart failure following cardiac surgery  S/p CABG  Atrial fibrillation with rapid ventricular response (H)  Hypertension, unspecified type  Dyspnea, unspecified type  Acute on chronic, recent surgery. Continue torsemide 20 mg once daily, tramadol 50 mg every 6 hours as needed, Tylenol 1000 mg every 6 hours as needed, atorvastatin 80 mg once daily. Increase KCl to 20 mEq once daily. Continue amiodarone 200 mg once daily, Eliquis 5 mg twice daily, aspirin 81 mg once daily, metoprolol due to low BPs and HR decrease to 25 mg once daily. Last WBC 13 on 10/19, check CBC 10/23. Na 133 on 10/19, recheck BMP on 10/23. Monitor vs and wt. Cardiology/surgeon f/u as recommended.      Cox's esophagus with dysplasia  Nausea  Chronic, stable. Continue famotidine 20 mg twice daily and Zofran 4 mg every 8 hours as needed.     Constipation  No symptoms. Senna S available PRN. Monitor.     Physical deconditioning  Acute, ongoing. Therapies as ordered and f/u progress    Rash  New onset start HC 1% cream to rash BID x 5 days    MED REC REQUIRED  Post Medication Reconciliation Status:  Discharge medications reconciled and changed, see notes/orders    Orders:  Decrease metoprolol XL to 25 mg daily  Start HC 1% cream to back rash BID x 5 days    Patient was seen along with DESIRAE Burdick student     I was present with the student who particpated in the serivce and documentation of the note. I have verified the history and personally peformed the physical exam and medical decision making. I agree with the assessment and plan of care as documented in the note.       Electronically signed by: DESIRAE Vizcaino CNP

## 2023-10-26 ENCOUNTER — LAB REQUISITION (OUTPATIENT)
Dept: LAB | Facility: CLINIC | Age: 78
End: 2023-10-26

## 2023-10-26 VITALS
OXYGEN SATURATION: 96 % | SYSTOLIC BLOOD PRESSURE: 126 MMHG | RESPIRATION RATE: 18 BRPM | BODY MASS INDEX: 32.24 KG/M2 | HEIGHT: 62 IN | TEMPERATURE: 97.6 F | DIASTOLIC BLOOD PRESSURE: 74 MMHG | WEIGHT: 175.2 LBS | HEART RATE: 63 BPM

## 2023-10-26 DIAGNOSIS — N39.0 URINARY TRACT INFECTION, SITE NOT SPECIFIED: ICD-10-CM

## 2023-10-26 LAB
ALBUMIN UR-MCNC: NEGATIVE MG/DL
APPEARANCE UR: CLEAR
BACTERIA #/AREA URNS HPF: ABNORMAL /HPF
BILIRUB UR QL STRIP: NEGATIVE
COLOR UR AUTO: ABNORMAL
GLUCOSE UR STRIP-MCNC: NEGATIVE MG/DL
HGB UR QL STRIP: NEGATIVE
HYALINE CASTS: 4 /LPF
KETONES UR STRIP-MCNC: NEGATIVE MG/DL
LEUKOCYTE ESTERASE UR QL STRIP: ABNORMAL
NITRATE UR QL: NEGATIVE
PH UR STRIP: 5.5 [PH] (ref 5–7)
RBC URINE: 1 /HPF
SP GR UR STRIP: 1.01 (ref 1–1.03)
SQUAMOUS EPITHELIAL: 1 /HPF
UROBILINOGEN UR STRIP-MCNC: NORMAL MG/DL
WBC CLUMPS #/AREA URNS HPF: PRESENT /HPF
WBC URINE: 17 /HPF

## 2023-10-26 PROCEDURE — 81001 URINALYSIS AUTO W/SCOPE: CPT | Performed by: NURSE PRACTITIONER

## 2023-10-26 PROCEDURE — 87186 SC STD MICRODIL/AGAR DIL: CPT | Performed by: NURSE PRACTITIONER

## 2023-10-27 ENCOUNTER — TRANSITIONAL CARE UNIT VISIT (OUTPATIENT)
Dept: GERIATRICS | Facility: CLINIC | Age: 78
End: 2023-10-27
Payer: MEDICARE

## 2023-10-27 DIAGNOSIS — R06.00 DYSPNEA, UNSPECIFIED TYPE: ICD-10-CM

## 2023-10-27 DIAGNOSIS — R53.81 PHYSICAL DECONDITIONING: ICD-10-CM

## 2023-10-27 DIAGNOSIS — R21 RASH: ICD-10-CM

## 2023-10-27 DIAGNOSIS — Z95.1 S/P CABG (CORONARY ARTERY BYPASS GRAFT): Primary | ICD-10-CM

## 2023-10-27 DIAGNOSIS — K22.719 BARRETT'S ESOPHAGUS WITH DYSPLASIA: ICD-10-CM

## 2023-10-27 DIAGNOSIS — I50.31 ACUTE DIASTOLIC CONGESTIVE HEART FAILURE (H): ICD-10-CM

## 2023-10-27 DIAGNOSIS — I97.130: ICD-10-CM

## 2023-10-27 DIAGNOSIS — I10 HYPERTENSION, UNSPECIFIED TYPE: ICD-10-CM

## 2023-10-27 DIAGNOSIS — K59.01 SLOW TRANSIT CONSTIPATION: ICD-10-CM

## 2023-10-27 DIAGNOSIS — R11.0 NAUSEA: ICD-10-CM

## 2023-10-27 DIAGNOSIS — I48.91 ATRIAL FIBRILLATION WITH RAPID VENTRICULAR RESPONSE (H): ICD-10-CM

## 2023-10-27 PROCEDURE — 99309 SBSQ NF CARE MODERATE MDM 30: CPT | Performed by: NURSE PRACTITIONER

## 2023-10-27 NOTE — PROGRESS NOTES
"[facility] GERIATRICS    Chief Complaint   Patient presents with    RECHECK     HPI:  Maribel Buenrostro is a 77 year old  (1945), who is being seen today for an episodic care visit at: [facility] (TCU) [[id number]].     Per recent TCU provider progress notes:   77 year old female who was recently hospitalized at [facility] (9/29-10/13/23) for A-fib RVR and subsequently NSTEMI requiring 3V CABG on 10/4.  She was discharged to TCU on 10/13/2023.  Presented to [facility] ED from TCU on 10/15/2023 for evaluation of shortness of breath, and was found to have a mild CHF exacerbation.  Chest x-ray showed small bilateral pleural effusions and mild interstitial pulmonary edema.  She was diuresed with IV Lasix.  TCU for rehab.     Today's concern is: episodic f/u pain, mobility, vs and medication recommendations from pharmacist. No headaches, dizziness, chest pain or discomfort, shortness of breath, abdominal pain, heartburn, bowel or bladder issues. Fatigue after therapies improving. Complaints of generalized pain, agreeable to scheduled Tylenol. UA neg, UC pending. -135/70-80, HR 57-63, sats 95% on room air.    Allergies, and PMH/PSH reviewed in EPIC today.    REVIEW OF SYSTEMS:  4 point ROS including Respiratory, CV, GI and , other than that noted in the HPI,  is negative    Objective:   /74   Pulse 63   Temp 97.6  F (36.4  C)   Resp 18   Ht 1.575 m (5' 2\")   Wt 79.5 kg (175 lb 3.2 oz)   SpO2 96%   BMI 32.04 kg/m    GENERAL APPEARANCE:  Alert, in no distress, pleasant, cooperative, oriented x self, place and recent events  EYES:  EOM, lids, pupils and irises normal, sclera clear and conjunctiva normal, no discharge or mattering on lids or lashes noted  ENT:  Mouth normal, moist mucous membranes, nose normal without drainage or crusting, external ears without lesions, hearing acuity intact  RESP:  respiratory effort normal, no chest wall tenderness, no respiratory distress, Lung sounds " clear, patient is on room air  CV:  Auscultation of heart done, rate and rhythm controlled and regular, no murmur, no rub or gallop. Edema none bilateral lower extremities  M/S:   Gait and station unsafe without assistance, no tenderness or swelling of the joints; able to move all extremities, digits normal  SKIN:  Inspection and palpation of skin and subcutaneous tissue: midline chest incision intact, no redness, open to air  NEURO: cranial nerves 2-12 grossly intact, no facial asymmetry, no speech deficits and able to follow directions, moves all extremities symmetrically  PSYCH:  insight and judgement and memory appear at baseline, affect and mood normal     Most Recent 3 CBC's:  Recent Labs   Lab Test 10/23/23  0818 10/19/23  0549 10/18/23  0543   WBC 8.7 13.0* 14.3*   HGB 11.6* 11.8 11.7   MCV 98 94 93    379 369     Most Recent 3 BMP's:  Recent Labs   Lab Test 10/23/23  0818 10/19/23  0549 10/18/23  0543    133* 132*   POTASSIUM 3.9 3.5 3.6   CHLORIDE 94* 92* 92*   CO2 31* 32* 32*   BUN 22.5 13.4 11.7   CR 0.90 0.65 0.60   ANIONGAP 12 9 8   JUAN 9.7 8.8 8.7*   * 102* 95       Assessment/Plan:  Acute diastolic congestive heart failure (H)  Postoperative heart failure following cardiac surgery  S/p CABG  Atrial fibrillation with rapid ventricular response (H)  Hypertension, unspecified type  Dyspnea, unspecified type  Acute on chronic, recent surgery. Continue torsemide 20 mg once daily, tramadol 50 mg every 6 hours as needed, change Tylenol to 1000 mg BID and BID PRN, atorvastatin 80 mg once daily. Increased KCl to 20 mEq once daily. Continue amiodarone 200 mg once daily, Eliquis 5 mg twice daily, aspirin 81 mg once daily, metoprolol due to low BPs and HR decreased to 25 mg once daily. Last WBC 13 on 10/19, check CBC 10/23 and WBC 8.7. Na 133 on 10/19, recheck BMP on 10/23 and Na 137. Monitor vs and wt. Cardiology/surgeon f/u as recommended.     Cox's esophagus with  dysplasia  Nausea  Chronic, stable. Change omeprazole to 20 mg once daily, discontinue famotidine due to impaired renal function. Continue Zofran 4 mg every 8hrs as needed.     Constipation  No symptoms. Senna S available PRN. Monitor.     Physical deconditioning  Acute, ongoing. Therapies as ordered and f/u progress    Rash  Resolved with HC cream. Monitor.     MED REC REQUIRED  Post Medication Reconciliation Status:  Discharge medications reconciled and changed, see notes/orders    Orders:  Change Prilosec to 20 mg daily diagnosis GERD  Stop Pepcid  Change tylenol to 1000 mg BID and BID PRN pain    Patient was seen along with DESIRAE Burdick student    I was present with the student who participated in the service and documentation of the note. I have verified the history and personally performed the physical exam and medical decision making. I agree with the assessment and plan of care as documented in the note.     Electronically signed by: DESIRAE Vizcaino CNP

## 2023-10-28 LAB — BACTERIA UR CULT: ABNORMAL

## 2023-10-30 ENCOUNTER — DISCHARGE SUMMARY NURSING HOME (OUTPATIENT)
Dept: GERIATRICS | Facility: CLINIC | Age: 78
End: 2023-10-30
Payer: MEDICARE

## 2023-10-30 VITALS
HEART RATE: 59 BPM | WEIGHT: 175 LBS | BODY MASS INDEX: 32.2 KG/M2 | SYSTOLIC BLOOD PRESSURE: 111 MMHG | OXYGEN SATURATION: 96 % | RESPIRATION RATE: 18 BRPM | HEIGHT: 62 IN | TEMPERATURE: 97.9 F | DIASTOLIC BLOOD PRESSURE: 79 MMHG

## 2023-10-30 DIAGNOSIS — I50.31 ACUTE HEART FAILURE WITH PRESERVED EJECTION FRACTION (HFPEF) (H): ICD-10-CM

## 2023-10-30 DIAGNOSIS — R11.0 NAUSEA: ICD-10-CM

## 2023-10-30 DIAGNOSIS — Z95.1 S/P CABG (CORONARY ARTERY BYPASS GRAFT): ICD-10-CM

## 2023-10-30 DIAGNOSIS — K22.719 BARRETT'S ESOPHAGUS WITH DYSPLASIA: ICD-10-CM

## 2023-10-30 DIAGNOSIS — N30.00 ACUTE CYSTITIS WITHOUT HEMATURIA: ICD-10-CM

## 2023-10-30 DIAGNOSIS — I10 HYPERTENSION, UNSPECIFIED TYPE: ICD-10-CM

## 2023-10-30 DIAGNOSIS — I48.91 ATRIAL FIBRILLATION WITH RAPID VENTRICULAR RESPONSE (H): ICD-10-CM

## 2023-10-30 DIAGNOSIS — R53.81 PHYSICAL DECONDITIONING: ICD-10-CM

## 2023-10-30 DIAGNOSIS — K59.01 SLOW TRANSIT CONSTIPATION: ICD-10-CM

## 2023-10-30 DIAGNOSIS — I50.31 ACUTE DIASTOLIC CONGESTIVE HEART FAILURE (H): Primary | ICD-10-CM

## 2023-10-30 DIAGNOSIS — R06.00 DYSPNEA, UNSPECIFIED TYPE: ICD-10-CM

## 2023-10-30 DIAGNOSIS — R21 RASH: ICD-10-CM

## 2023-10-30 DIAGNOSIS — K22.719 BARRETT'S ESOPHAGUS WITH DYSPLASIA: Primary | ICD-10-CM

## 2023-10-30 DIAGNOSIS — I97.130: ICD-10-CM

## 2023-10-30 PROCEDURE — 99316 NF DSCHRG MGMT 30 MIN+: CPT | Performed by: NURSE PRACTITIONER

## 2023-10-30 RX ORDER — TORSEMIDE 20 MG/1
20 TABLET ORAL DAILY
Qty: 30 TABLET | Refills: 0 | Status: SHIPPED | OUTPATIENT
Start: 2023-10-30 | End: 2023-11-20

## 2023-10-30 RX ORDER — OMEPRAZOLE 20 MG/1
20 TABLET, DELAYED RELEASE ORAL DAILY
Qty: 30 TABLET | Refills: 0 | Status: SHIPPED | OUTPATIENT
Start: 2023-10-30

## 2023-10-30 RX ORDER — METOPROLOL SUCCINATE 25 MG/1
25 TABLET, EXTENDED RELEASE ORAL DAILY
Qty: 30 TABLET | Refills: 0 | Status: SHIPPED | OUTPATIENT
Start: 2023-10-30 | End: 2023-11-20

## 2023-10-30 RX ORDER — LISINOPRIL 20 MG/1
20 TABLET ORAL DAILY
Qty: 30 TABLET | Refills: 0 | Status: SHIPPED | OUTPATIENT
Start: 2023-10-30 | End: 2023-11-20

## 2023-10-30 RX ORDER — POTASSIUM CHLORIDE 1500 MG/1
20 TABLET, EXTENDED RELEASE ORAL DAILY
Qty: 30 TABLET | Refills: 0 | Status: SHIPPED | OUTPATIENT
Start: 2023-10-30 | End: 2023-11-20

## 2023-10-30 NOTE — PROGRESS NOTES
[facility] GERIATRICS DISCHARGE SUMMARY  PATIENT'S NAME: Maribel Buenrostro  YOB: 1945  MEDICAL RECORD NUMBER:  [id number]  Place of Service where encounter took place:  [facility] ([facility] [[id number]]    PRIMARY CARE PROVIDER AND CLINIC RESPONSIBLE AFTER TRANSFER:   [facility], [address] [facility] [address]    Non-FMG Provider     Transferring providers: DESIRAE Vizcaino CNP, Dr. Maame MD  Recent Hospitalization/ED:  [facility] stay 10/15/23 to 10/19/23.  Date of SNF Admission:  10/19/23  Date of SNF (anticipated) Discharge:  11/2/23 or when ready  Discharged to: previous independent home  Cognitive Scores: SLUMS: 28/30  Physical Function: up independently with FWW  DME: No DME needed    CODE STATUS/ADVANCE DIRECTIVES DISCUSSION:  No CPR- Pre-arrest intubation OK   ALLERGIES: Patient has no known allergies.    NURSING FACILITY COURSE   Medication Changes/Rationale:   Stopped Pepcid due to renal impairment  Scheduled PPI due to GERD  Scheduled tylenol due to pain  Decreased Toprol XL dose due to hypotension and bradycardia  Increased KCL dose due to hypokalemia    Per recent TCU provider progress notes:   77 year old female who was recently hospitalized at [facility] (9/29-10/13/23) for A-fib RVR and subsequently NSTEMI requiring 3V CABG on 10/4.  She was discharged to TCU on 10/13/2023.  Presented to [facility] ED from TCU on 10/15/2023 for evaluation of shortness of breath, and was found to have a mild CHF exacerbation.  Chest x-ray showed small bilateral pleural effusions and mild interstitial pulmonary edema.  She was diuresed with IV Lasix.  TCU for rehab.     Seen for discharge visit. No new concerns. BP range 105-127/65-85 and sats 96% room air. Home with home care as recommended.     Summary of nursing facility stay:   Acute diastolic congestive heart failure (H)  Postoperative heart failure following cardiac  surgery  S/p CABG  Atrial fibrillation with rapid ventricular response (H)  Hypertension, unspecified type  Dyspnea, unspecified type  Acute on chronic, recent surgery. Continue torsemide 20 mg once daily, tramadol 50 mg every 6 hours as needed, change Tylenol to 1000 mg BID and BID PRN, atorvastatin 80 mg once daily. Increased KCl to 20 mEq once daily. Continue amiodarone 200 mg once daily, Eliquis 5 mg twice daily, aspirin 81 mg once daily, metoprolol due to low BPs and HR decreased to 25 mg once daily. Last WBC 13 on 10/19, check CBC 10/23 and WBC 8.7. Na 133 on 10/19, recheck BMP on 10/23 and Na 137. Monitor vs and wt. Cardiology/surgeon f/u as recommended.     Cox's esophagus with dysplasia  Nausea  Chronic, stable. Changed omeprazole to 20 mg once daily, discontinued famotidine due to impaired renal function. Continue Zofran 4 mg every 8hrs as needed.     Constipation  No symptoms. Senna S available PRN. Monitor.     Physical deconditioning  Acute, ongoing. Home with home care as recommended.    Rash  Resolved with HC cream. Monitor.     UTI  UC positive, started Macrobid 100 mg BID x 5 days. Monitor.     Discharge Medications:  MED REC REQUIRED  Post Medication Reconciliation Status:  Discharge medications reconciled and changed, see notes/orders    Current Outpatient Medications   Medication Sig Dispense Refill    acetaminophen (TYLENOL) 500 MG tablet Take 1,000 mg by mouth every 6 hours as needed for mild pain      amiodarone (PACERONE) 200 MG tablet Take 200 mg by mouth daily      apixaban ANTICOAGULANT (ELIQUIS) 5 MG tablet Take 5 mg by mouth 2 times daily      aspirin 81 MG EC tablet Take 81 mg by mouth daily      atorvastatin (LIPITOR) 80 MG tablet Take 80 mg by mouth daily      bisacodyl (DULCOLAX) 5 MG EC tablet Take 5 mg by mouth daily as needed for constipation      lisinopril (ZESTRIL) 20 MG tablet Take 1 tablet (20 mg) by mouth daily      metoprolol succinate ER (TOPROL XL) 25 MG 24 hr tablet  "Take 25 mg by mouth daily      omeprazole (PRILOSEC OTC) 20 MG EC tablet Take 20 mg by mouth daily      ondansetron (ZOFRAN ODT) 4 MG ODT tab Take 4 mg by mouth every 8 hours as needed for nausea      polyethylene glycol (MIRALAX) 17 g packet Take 1 packet by mouth daily      potassium chloride ER (KLOR-CON M) 10 MEQ CR tablet Take 10 mEq by mouth daily      senna-docusate (SENOKOT-S/PERICOLACE) 8.6-50 MG tablet Take 1 tablet by mouth daily as needed for constipation (1-2) at bedtime      torsemide (DEMADEX) 20 MG tablet Take 1 tablet (20 mg) by mouth daily      traMADol (ULTRAM) 50 MG tablet Take 1 tablet (50 mg) by mouth every 6 hours as needed for severe pain 20 tablet 0    vitamin B-Complex Take 1 tablet by mouth daily      Vitamin D3 (CHOLECALCIFEROL) 25 mcg (1000 units) tablet Take 25 mcg by mouth daily          Controlled medications:   not applicable/none requested     Past Medical History:   Past Medical History:   Diagnosis Date    Cox's esophagus     CAD (coronary artery disease)     s/p 3-v CABG (LIMA to LAD, SVG to ramus, SVG to diag) on 10/4/23    Hypertrophic cardiomyopathy (H)     Irritable bowel syndrome     Paroxysmal atrial fibrillation (H)      Physical Exam:   Vitals: /79   Pulse 59   Temp 97.9  F (36.6  C)   Resp 18   Ht 1.575 m (5' 2\")   Wt 79.4 kg (175 lb)   SpO2 96%   BMI 32.01 kg/m    BMI: Body mass index is 32.01 kg/m .  GENERAL APPEARANCE:  Alert, in no distress, pleasant, cooperative, oriented x self, place and recent events  EYES:  EOM, lids, pupils and irises normal, sclera clear and conjunctiva normal, no discharge or mattering on lids or lashes noted  ENT:  Mouth normal, moist mucous membranes, nose normal without drainage or crusting, external ears without lesions, hearing acuity intact  RESP:  respiratory effort normal, no chest wall tenderness, no respiratory distress, Lung sounds clear, patient is on room air  CV:  Auscultation of heart done, rate and rhythm " controlled and regular, no murmur, no rub or gallop. Edema none bilateral lower extremities  M/S:   Gait and station unsafe without assistance, no tenderness or swelling of the joints; able to move all extremities, digits normal  SKIN:  Inspection and palpation of skin and subcutaneous tissue: midline chest incision intact, no redness, open to air  NEURO: cranial nerves 2-12 grossly intact, no facial asymmetry, no speech deficits and able to follow directions, moves all extremities symmetrically  PSYCH:  insight and judgement and memory appear at baseline, affect and mood normal     SNF labs: Most Recent 3 CBC's:  Recent Labs   Lab Test 10/23/23  0818 10/19/23  0549 10/18/23  0543   WBC 8.7 13.0* 14.3*   HGB 11.6* 11.8 11.7   MCV 98 94 93    379 369     Most Recent 3 BMP's:  Recent Labs   Lab Test 10/23/23  0818 10/19/23  0549 10/18/23  0543    133* 132*   POTASSIUM 3.9 3.5 3.6   CHLORIDE 94* 92* 92*   CO2 31* 32* 32*   BUN 22.5 13.4 11.7   CR 0.90 0.65 0.60   ANIONGAP 12 9 8   JUAN 9.7 8.8 8.7*   * 102* 95   Estimated Creatinine Clearance: 50.3 mL/min (based on SCr of 0.9 mg/dL).     DISCHARGE PLAN:  Follow up labs: No labs orders/due  Medical Follow Up:      Follow up with primary care provider in 2-3 weeks  Follow up with specialist cardiology as recommended   Current [place] scheduled appointments:     Future Appointments 10/31/2023 - 4/28/2024        Date Visit Type Length Department Provider     11/20/2023  9:00 AM LAB 15 min [facility] LABORATORY WORK[facility] LAB    Location Instructions:     Our clinic is located at [address], [place] [facility] [place] [address]. For your convenience, [employer] parking is available, or you may park your vehicle at the parking ramp west of [address] across the street from [facility]. You will cross the skyway to access our clinic on the 2nd floor.               11/20/2023 11:00 AM RETURN CARDIOLOGY 30 min [facility]  Paula Frazier APRN CNP    Location Instructions:     Our clinic is located at [address], [place] [address]. You can park your vehicle at the parking ramp west of [address] across the street from [facility]. You will cross the skyway to access our clinic on the 2nd floor.                    Discharge Services: [facility] for PT, OT, RN  Discharge Instructions Verbalized to Patient at Discharge:   10/31 started Macrobid 100 mg BID x 5 days due to UTI    TOTAL DISCHARGE TIME:   Greater than 30 minutes  Electronically signed by:  DESIRAE Vizcaino CNP

## 2023-11-20 ENCOUNTER — OFFICE VISIT (OUTPATIENT)
Dept: CARDIOLOGY | Facility: CLINIC | Age: 78
End: 2023-11-20
Payer: MEDICARE

## 2023-11-20 ENCOUNTER — LAB (OUTPATIENT)
Dept: LAB | Facility: CLINIC | Age: 78
End: 2023-11-20
Payer: MEDICARE

## 2023-11-20 VITALS
HEART RATE: 52 BPM | WEIGHT: 172 LBS | DIASTOLIC BLOOD PRESSURE: 64 MMHG | BODY MASS INDEX: 31.65 KG/M2 | OXYGEN SATURATION: 99 % | HEIGHT: 62 IN | SYSTOLIC BLOOD PRESSURE: 114 MMHG

## 2023-11-20 DIAGNOSIS — I50.31 ACUTE HEART FAILURE WITH PRESERVED EJECTION FRACTION (HFPEF) (H): ICD-10-CM

## 2023-11-20 DIAGNOSIS — I48.0 PAROXYSMAL ATRIAL FIBRILLATION (H): ICD-10-CM

## 2023-11-20 DIAGNOSIS — N17.9 AKI (ACUTE KIDNEY INJURY) (H): ICD-10-CM

## 2023-11-20 DIAGNOSIS — E78.00 HYPERCHOLESTEROLEMIA: ICD-10-CM

## 2023-11-20 DIAGNOSIS — E78.5 DYSLIPIDEMIA: Primary | ICD-10-CM

## 2023-11-20 DIAGNOSIS — I25.10 CORONARY ARTERY DISEASE INVOLVING NATIVE CORONARY ARTERY OF NATIVE HEART WITHOUT ANGINA PECTORIS: ICD-10-CM

## 2023-11-20 DIAGNOSIS — I50.30 NYHA CLASS 3 HEART FAILURE WITH PRESERVED EJECTION FRACTION (H): ICD-10-CM

## 2023-11-20 LAB
ANION GAP SERPL CALCULATED.3IONS-SCNC: 13 MMOL/L (ref 7–15)
BUN SERPL-MCNC: 29 MG/DL (ref 8–23)
CALCIUM SERPL-MCNC: 9.7 MG/DL (ref 8.8–10.2)
CHLORIDE SERPL-SCNC: 96 MMOL/L (ref 98–107)
CREAT SERPL-MCNC: 1.4 MG/DL (ref 0.51–0.95)
DEPRECATED HCO3 PLAS-SCNC: 25 MMOL/L (ref 22–29)
EGFRCR SERPLBLD CKD-EPI 2021: 38 ML/MIN/1.73M2
GLUCOSE SERPL-MCNC: 114 MG/DL (ref 70–99)
POTASSIUM SERPL-SCNC: 3.8 MMOL/L (ref 3.4–5.3)
SODIUM SERPL-SCNC: 134 MMOL/L (ref 135–145)

## 2023-11-20 PROCEDURE — 36415 COLL VENOUS BLD VENIPUNCTURE: CPT | Performed by: NURSE PRACTITIONER

## 2023-11-20 PROCEDURE — 99215 OFFICE O/P EST HI 40 MIN: CPT | Performed by: NURSE PRACTITIONER

## 2023-11-20 PROCEDURE — 80048 BASIC METABOLIC PNL TOTAL CA: CPT | Performed by: NURSE PRACTITIONER

## 2023-11-20 RX ORDER — POTASSIUM CHLORIDE 1500 MG/1
20 TABLET, EXTENDED RELEASE ORAL DAILY PRN
Qty: 30 TABLET | Refills: 1 | Status: SHIPPED | OUTPATIENT
Start: 2023-11-20

## 2023-11-20 RX ORDER — LISINOPRIL 20 MG/1
20 TABLET ORAL DAILY
Qty: 90 TABLET | Refills: 1 | Status: SHIPPED | OUTPATIENT
Start: 2023-11-20 | End: 2024-05-30

## 2023-11-20 RX ORDER — ATORVASTATIN CALCIUM 80 MG/1
80 TABLET, FILM COATED ORAL DAILY
Qty: 90 TABLET | Refills: 3 | Status: SHIPPED | OUTPATIENT
Start: 2023-11-20

## 2023-11-20 RX ORDER — METOPROLOL SUCCINATE 25 MG/1
25 TABLET, EXTENDED RELEASE ORAL DAILY
Qty: 90 TABLET | Refills: 1 | Status: SHIPPED | OUTPATIENT
Start: 2023-11-20 | End: 2024-02-21

## 2023-11-20 RX ORDER — TORSEMIDE 20 MG/1
20 TABLET ORAL DAILY PRN
Qty: 30 TABLET | Refills: 1 | Status: SHIPPED | OUTPATIENT
Start: 2023-11-20
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
## 2023-11-20 NOTE — PATIENT INSTRUCTIONS
Stay off amiodarone  RESUME atorvastatin 80mg daily   CHANGE Torsemide 20mg to only take as needed for weight gain > 3 pounds overnight or increased swelling/shortness of breath.   CHANGE potassium to 20meq only take if dose of torsemide needed  Get labs drawn in 1-2 weeks to monitor kidney function  Follow up with Dr. Kelly in 3 months with labs prior  Please call with any questions/concerns [phone]
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
## 2023-11-28 ENCOUNTER — TELEPHONE (OUTPATIENT)
Dept: CARDIOLOGY | Facility: CLINIC | Age: 78
End: 2023-11-28
Payer: MEDICARE

## 2023-11-28 DIAGNOSIS — I48.0 PAROXYSMAL ATRIAL FIBRILLATION (H): Primary | ICD-10-CM

## 2023-11-28 NOTE — TELEPHONE ENCOUNTER
Reviewed phone encounter, glad to hear things have remained stable on PRN torsemide.     Can you please confirm she has repeat labs planned for over the next week.    DESIRAE Nguyen CNP  [facility]  Pager: [phone]

## 2023-11-28 NOTE — TELEPHONE ENCOUNTER
Called Maribel to follow-up on weight/HF symptoms after changing to torsemide PRN.  She reports she feels well overall.    Weight: 174 lbs for past few days.  Prior to that, she was 172 lbs.  SOB: Denies  Edema: Denies  BP:  (last taken a few days ago).  She cannot recall the diastolic.  She denies symptoms of light-headedness or dizziness.  Her next RN home visit in 11/30, and they will check her BP again then.     Explained that Team 1 RN will provide an update to Paula, and call if there are any new recommendations.  Routing to Paula for review.         Call pt  Received: Yesterday  Martha Joe, RN  [facility] Heart Team 1  See below - call pt to follow-up per Paula's request below.    BMP order faxed 11/21.          Previous Messages       ----- Message -----  From: Paula Frazier APRN CNP  Sent: 11/20/2023   4:46 PM CST  To: [facility] Heart Team 1  Subject: Fax BMP order to be drawn at PCP office in 1*    HI,    Can you please fax BMP to PCP office to be drawn in 1-2 weeks, can you ensure we get results for review.    Additionally can you call patient next week to follow up on weight/HF symptoms after changing torsemide to PRN.    Thanks,  Paula

## 2023-11-29 NOTE — TELEPHONE ENCOUNTER
Called and spoke Maribel.  She had labs drawn today, 11/29, at her PCP.      She requested a 30-day prescription for eliquis, as when she went to have the 90-day supply filled, it would have costed her over $400.  Discussed she may be in an insurance coverage gap, seeing as it's near the end of 2023.  Suggested she try to  a 30-day supply to save money, seeing as 2024 is near, and hopefully she will have full medication coverage again at that time.  [facility] Cardiology Refill Guideline reviewed.  Medication meets criteria for refill.  Routing to [place] with update.

## 2023-11-30 ENCOUNTER — TELEPHONE (OUTPATIENT)
Dept: CARDIOLOGY | Facility: CLINIC | Age: 78
End: 2023-11-30
Payer: MEDICARE

## 2023-11-30 NOTE — TELEPHONE ENCOUNTER
Called and spoke with Maribel to communicate the her kidney function has improved, and to continue with her current medication regimen per Paula.

## 2023-11-30 NOTE — TELEPHONE ENCOUNTER
BMP reviewed, renal function improved with changing torsemide to PRN. No further changes at this time.     DESIRAE Nguyen CNP  [facility]  Pager: [phone]

## 2023-11-30 NOTE — TELEPHONE ENCOUNTER
Routing 11/29/23 BMP to Paula for review.  Creatinine improved since changing to torsemide PRN on 11/20/23.      Pt's labwork from yesterday is now in Epic as follows:    11/29/23 [facility]        Previous [facility] results included below for comparison:

## 2023-12-04 ENCOUNTER — TELEPHONE (OUTPATIENT)
Dept: CARDIOLOGY | Facility: CLINIC | Age: 78
End: 2023-12-04
Payer: MEDICARE

## 2023-12-04 NOTE — TELEPHONE ENCOUNTER
Received request to hold blood thinning medication from [facility] asking if pt can hold Eliquis for 3 days prior to an injection and if bridging is required. Pt is prescribed Eliquis 5 mg BID for PAF. Last visit with SARAH Mitchell on 11/20/23. Form placed on Paula's desk, routing to her to review.

## 2024-02-21 ENCOUNTER — OFFICE VISIT (OUTPATIENT)
Dept: CARDIOLOGY | Facility: CLINIC | Age: 79
End: 2024-02-21
Attending: NURSE PRACTITIONER
Payer: MEDICARE

## 2024-02-21 VITALS
OXYGEN SATURATION: 96 % | HEIGHT: 62 IN | SYSTOLIC BLOOD PRESSURE: 139 MMHG | WEIGHT: 181 LBS | BODY MASS INDEX: 33.31 KG/M2 | HEART RATE: 64 BPM | DIASTOLIC BLOOD PRESSURE: 82 MMHG

## 2024-02-21 DIAGNOSIS — E78.5 DYSLIPIDEMIA: ICD-10-CM

## 2024-02-21 DIAGNOSIS — I50.31 ACUTE HEART FAILURE WITH PRESERVED EJECTION FRACTION (HFPEF) (H): ICD-10-CM

## 2024-02-21 DIAGNOSIS — I10 HYPERTENSION, UNSPECIFIED TYPE: ICD-10-CM

## 2024-02-21 DIAGNOSIS — I25.10 CORONARY ARTERY DISEASE INVOLVING NATIVE CORONARY ARTERY OF NATIVE HEART WITHOUT ANGINA PECTORIS: ICD-10-CM

## 2024-02-21 DIAGNOSIS — I48.0 PAROXYSMAL ATRIAL FIBRILLATION (H): ICD-10-CM

## 2024-02-21 DIAGNOSIS — I42.2 HYPERTROPHIC CARDIOMYOPATHY (H): ICD-10-CM

## 2024-02-21 DIAGNOSIS — I50.32 CHRONIC DIASTOLIC CONGESTIVE HEART FAILURE (H): Primary | ICD-10-CM

## 2024-02-21 PROCEDURE — 99215 OFFICE O/P EST HI 40 MIN: CPT | Performed by: INTERNAL MEDICINE

## 2024-02-21 RX ORDER — METOPROLOL SUCCINATE 50 MG/1
50 TABLET, EXTENDED RELEASE ORAL DAILY
Qty: 90 TABLET | Refills: 3 | Status: SHIPPED | OUTPATIENT
Start: 2024-02-21

## 2024-02-21 NOTE — PATIENT INSTRUCTIONS
Weight   Tosemide dose    Potassium    Less than 175 lbs None   None    175-180 lbs  10 mg daily  10 mEQ     181 lbs or more 20 mg daily  20 mEq

## 2024-02-21 NOTE — LETTER
2/21/2024    [facility]  [address] [address]    RE: Maribel Buenrostro       Dear Colleague,     I had the pleasure of seeing Maribel Buenrostro in the [facility].    General Cardiology Clinic Progress Note  Maribel Buenrostro MRN# [id number]   YOB: 1945 Age: 78 year old       Reason for visit: Apical hypertrophic cardiomyopathy, chronic HFpEF, coronary artery disease, paroxysmal atrial fibrillation    History of presenting illness:    I had the opportunity to see Maribel Buenrostro at [facility]iology today for reevaluation of chronic HFpEF in the setting of apical hypertrophic cardiomyopathy.  I saw her initially when she was hospitalized at [facility] between 10/15 and 10/19/2023 with acute HFpEF following coronary artery bypass surgery at [facility].  On 10/4/2023 she underwent LIMA to LAD, vein graft to ramus, and vein graft to diagonal.  She was already starting to retain fluid at discharge but was not sent home on any diuretic.    After initial diuresis in the hospital, she was discharged on torsemide 20 mg a day to the TCU.  Her discharge weight was 175 pounds.    When she followed up in November, her weight was down further, between 170 and 172 pounds and she was not having any shortness of breath.  She is somewhat limited by chronic low back and knee pain but can walk about a block before she has to stop due to shortness of breath.  She is having some coughing when laying down flat at night.  This is a dry nonproductive cough.  She denies shortness of breath while lying in bed.  If she elevates the head of her bed, her coughing improved.  She has not had any further problems with edema.    At her follow-up in November, she appeared to be somewhat dehydrated with an elevated creatinine of 1.4 and her diuretic was cut back.  She was advised to take torsemide as needed for weight gain or shortness of breath.  She has been  "taking it occasionally, especially when she feels some shortness of breath or feels \"bloated.\"  But her weight is up to 181 pounds at home today.    At her visit in November she did restart Eliquis for paroxysmal atrial fibrillation but has not noticed any symptomatic recurrences of atrial fibrillation.  She was briefly on amiodarone after surgery but that was discontinued after about a month.  She restarted her atorvastatin after she got home from the TCU as well which was left off her TCU discharge medication list.    Her labs today look good, with an LDL of 46 and HDL of 80.  Her basic metabolic panel is normal with a creatinine now of 0.85 and BUN 13 with a GFR 70.    Her blood pressure is 139/82, heart rate 64, weight 181 pounds at home.  She was weighed in her winter coat today at 188 pounds here.  Her heart rhythm is regular.  Her lungs are clear.  She has a soft systolic murmur.          Assessment and Plan:     ASSESSMENT:    Ms. Maribel Buenrostro is a 78-year-old woman with hypertension, dyslipidemia, and apical variant of hypertrophic cardiomyopathy.  She has coronary artery disease and recently underwent three-vessel bypass surgery at [facility] on 10/4/2023.  She developed acute decompensated diastolic heart failure after bypass surgery and came to [facility] for treatment.  She did have some episodes of rapid atrial fibrillation last summer causing shortness of breath we have not seen any recurrence of atrial fibrillation since her bypass surgery.  She was briefly on amiodarone and is now off that medication.    She is back on atorvastatin with excellent cholesterol numbers.    I will increase her metoprolol XL from 25 to 50 mg a day for additional blood pressure control.    She is having some mild symptoms of congestive heart failure including some bloating and nonproductive cough while lying in bed at night, and her weight is up slightly today to 181 pounds.  I suggested that she " take her torsemide on a sliding scale.  She will take torsemide 20 mg daily and potassium chloride 20 mEq daily for weight above 180 pounds.  Between 175 and 179 pounds, she will take torsemide 10 mg daily and potassium chloride 10 mill equivalents daily.  If her weight is below 175, I will have her hold her torsemide and potassium.    I will have her follow-up with us in core clinic again in 6 months for reevaluation of these issues.  I will see her myself again in 1 year.      AUBREE BOSCH MD     The longitudinal plan of care for the condition(s) below were addressed during this visit. Due to the added complexity in care, I will continue to support Maribel in the subsequent management of this condition(s) and with the ongoing continuity of care of this condition(s).    Problem List Items Addressed This Visit as of 2/21/2024      CHF (congestive heart failure) (H) - Primary    HTN (hypertension)    Hypertrophic cardiomyopathy (H)                Orders this Visit:  Orders Placed This Encounter   Procedures    Basic metabolic panel    Hemoglobin    Basic metabolic panel    Hemoglobin    Lipid Profile    ALT    Follow-Up with Cardiology [place] [facility]    Follow-Up with Cardiology [facility] (Self)    Echocardiogram Complete     Orders Placed This Encounter   Medications    metoprolol succinate ER (TOPROL XL) 50 MG 24 hr tablet     Sig: Take 1 tablet (50 mg) by mouth daily     Dispense:  90 tablet     Refill:  3     Medications Discontinued During This Encounter   Medication Reason    metoprolol succinate ER (TOPROL XL) 25 MG 24 hr tablet        Today's clinic visit entailed:    45 minutes spent by me on the date of the encounter doing chart review, history and exam, documentation and further activities per the note  Provider  Link to [facility] Help Grid     The level of medical decision making during this visit was of high complexity.           Review of Systems:     Review of Systems:  Skin:  Negative rash;itching;bruising;lumps  "or bumps   Eyes:  Positive for cataracts  ENT:  Negative nasal congestion;deafness;tinnitus;vertigo;hearing loss  Respiratory:  Positive for cough  Cardiovascular:  Negative;palpitations;chest pain;edema;fatigue;lightheadedness Positive for;dizziness  Gastroenterology: Negative poor appetite;nausea;vomiting;heartburn  Genitourinary:  Positive for urinary frequency  Musculoskeletal:  Positive for arthritis  Neurologic:  Negative migraine headaches;seizures;paralysis;numbness or tingling of hands;numbness or tingling of feet  Psychiatric:  Negative excessive stress;sleep disturbances;anxiety;depression  Heme/Lymph/Imm:  Negative anemia;bleeding disorder;night sweats  Endocrine:  Negative thyroid disorder;diabetes            Physical Exam:     Vitals: /82 (BP Location: Left arm, Patient Position: Sitting)   Pulse 64   Ht 1.575 m (5' 2\")   Wt 82.1 kg (181 lb)   SpO2 96%   BMI 33.11 kg/m    Constitutional: Well nourished and in no apparent distress.  Eyes: Pupils equal, round. Sclerae anicteric.   HEENT: Normocephalic, atraumatic.   Neck: Supple. JVD   Respiratory: Breathing non-labored. Lungs clear to auscultation bilaterally. No crackles, wheezes, rhonchi, or rales.  Cardiovascular:  Regular rate and rhythm, normal S1 and S2. No murmur, rub, or gallop.  Skin: Warm, dry. No rashes, cyanosis, or xanthelasma.  Extremities: No edema.  Neurologic: No gross motor deficits. Alert, awake, and oriented to person, place and time.  Psychiatric: Affect appropriate.             Medications:     Current Outpatient Medications   Medication Sig Dispense Refill    acetaminophen (TYLENOL) 500 MG tablet Take 1,000 mg by mouth every 6 hours as needed for mild pain      apixaban ANTICOAGULANT (ELIQUIS) 5 MG tablet Take 1 tablet (5 mg) by mouth 2 times daily 60 tablet 0    apixaban ANTICOAGULANT (ELIQUIS) 5 MG tablet Take 1 tablet (5 mg) by mouth 2 times daily 180 tablet 3    aspirin 81 MG EC tablet Take 81 mg by mouth daily      " atorvastatin (LIPITOR) 80 MG tablet Take 1 tablet (80 mg) by mouth daily 90 tablet 3    lisinopril (ZESTRIL) 20 MG tablet Take 1 tablet (20 mg) by mouth daily 90 tablet 1    metoprolol succinate ER (TOPROL XL) 50 MG 24 hr tablet Take 1 tablet (50 mg) by mouth daily 90 tablet 3    omeprazole (PRILOSEC OTC) 20 MG EC tablet Take 1 tablet (20 mg) by mouth daily 30 tablet 0    polyethylene glycol (MIRALAX) 17 g packet Take 1 packet by mouth daily      potassium chloride ER (KLOR-CON M) 20 MEQ CR tablet Take 1 tablet (20 mEq) by mouth daily as needed for potassium supplementation (only take if needing to take a dose of torsemide) 30 tablet 1    torsemide (DEMADEX) 20 MG tablet Take 1 tablet (20 mg) by mouth daily as needed (Weight gain > 3 pounds overnight, increased swelling or increased shortness of breath) 30 tablet 1    traMADol (ULTRAM) 50 MG tablet Take 1 tablet (50 mg) by mouth every 6 hours as needed for severe pain 20 tablet 0    vitamin B-Complex Take 1 tablet by mouth daily      Vitamin D3 (CHOLECALCIFEROL) 25 mcg (1000 units) tablet Take 25 mcg by mouth daily      ondansetron (ZOFRAN ODT) 4 MG ODT tab Take 4 mg by mouth every 8 hours as needed for nausea (Patient not taking: Reported on 2/21/2024)         No family history on file.    Social History     Socioeconomic History    Marital status:      Spouse name: Not on file    Number of children: Not on file    Years of education: Not on file    Highest education level: Not on file   Occupational History    Not on file   Tobacco Use    Smoking status: Never    Smokeless tobacco: Never   Substance and Sexual Activity    Alcohol use: Yes     Comment: 2-3 drinks a day    Drug use: Not on file    Sexual activity: Not on file   Other Topics Concern    Not on file   Social History Narrative    Not on file     Social Determinants of Health     Financial Resource Strain: Not on file   Food Insecurity: Not on file   Transportation Needs: Not on file   Physical  "Activity: Not on file   Stress: Not on file   Social Connections: Not on file   Interpersonal Safety: Not on file   Housing Stability: Not on file            Past Medical History:     Past Medical History:   Diagnosis Date    Cox's esophagus     CAD (coronary artery disease)     s/p 3-v CABG (LIMA to LAD, SVG to ramus, SVG to diag) on 10/4/23    Hypertrophic cardiomyopathy (H)     Irritable bowel syndrome     Paroxysmal atrial fibrillation (H)               Past Surgical History:     Past Surgical History:   Procedure Laterality Date    ND CABG, ARTERY-VEIN, THREE                Allergies:   Patient has no known allergies.       Data:   All laboratory data reviewed:    Recent Labs   Lab Test 02/16/24  1139   TRIG 81       Lab Results   Component Value Date    WBC 8.7 10/23/2023    RBC 3.75 (L) 10/23/2023    HGB 11.6 (L) 10/23/2023    HCT 36.6 10/23/2023    MCV 98 10/23/2023    MCH 30.9 10/23/2023    MCHC 31.7 10/23/2023    RDW 14.6 10/23/2023     10/23/2023       Lab Results   Component Value Date     (L) 11/20/2023    POTASSIUM 3.8 11/20/2023    CHLORIDE 100 02/16/2024    CO2 25 11/20/2023    ANIONGAP 13 11/20/2023     (H) 11/20/2023    GLC 97 10/18/2023    BUN 29.0 (H) 11/20/2023    CR 1.40 (H) 11/20/2023    GFRESTIMATED 38 (L) 11/20/2023    JUAN 9.7 11/20/2023      No results found for: \"AST\", \"ALT\"    No results found for: \"A1C\"    No results found for: \"INR\"      AUBREE BOSCH MD  [facility]    Thank you for allowing me to participate in the care of your patient.      Sincerely,     AUBREE BOSCH MD     [facility]  cc:   Paula Frazier, APRN CNP  [address]  [place] [address]      "

## 2024-02-21 NOTE — PROGRESS NOTES
"  General Cardiology Clinic Progress Note  Maribel Buenrostro MRN# [id number]   YOB: 1945 Age: 78 year old       Reason for visit: Apical hypertrophic cardiomyopathy, chronic HFpEF, coronary artery disease, paroxysmal atrial fibrillation    History of presenting illness:    I had the opportunity to see Maribel Buenrostro at [facility] today for reevaluation of chronic HFpEF in the setting of apical hypertrophic cardiomyopathy.  I saw her initially when she was hospitalized at [facility] between 10/15 and 10/19/2023 with acute HFpEF following coronary artery bypass surgery at [facility].  On 10/4/2023 she underwent LIMA to LAD, vein graft to ramus, and vein graft to diagonal.  She was already starting to retain fluid at discharge but was not sent home on any diuretic.    After initial diuresis in the hospital, she was discharged on torsemide 20 mg a day to the TCU.  Her discharge weight was 175 pounds.    When she followed up in November, her weight was down further, between 170 and 172 pounds and she was not having any shortness of breath.  She is somewhat limited by chronic low back and knee pain but can walk about a block before she has to stop due to shortness of breath.  She is having some coughing when laying down flat at night.  This is a dry nonproductive cough.  She denies shortness of breath while lying in bed.  If she elevates the head of her bed, her coughing improved.  She has not had any further problems with edema.    At her follow-up in November, she appeared to be somewhat dehydrated with an elevated creatinine of 1.4 and her diuretic was cut back.  She was advised to take torsemide as needed for weight gain or shortness of breath.  She has been taking it occasionally, especially when she feels some shortness of breath or feels \"bloated.\"  But her weight is up to 181 pounds at home today.    At her visit in November she did restart Eliquis for paroxysmal atrial " fibrillation but has not noticed any symptomatic recurrences of atrial fibrillation.  She was briefly on amiodarone after surgery but that was discontinued after about a month.  She restarted her atorvastatin after she got home from the TCU as well which was left off her TCU discharge medication list.    Her labs today look good, with an LDL of 46 and HDL of 80.  Her basic metabolic panel is normal with a creatinine now of 0.85 and BUN 13 with a GFR 70.    Her blood pressure is 139/82, heart rate 64, weight 181 pounds at home.  She was weighed in her winter coat today at 188 pounds here.  Her heart rhythm is regular.  Her lungs are clear.  She has a soft systolic murmur.          Assessment and Plan:     ASSESSMENT:    Ms. Maribel Buenrostro is a 78-year-old woman with hypertension, dyslipidemia, and apical variant of hypertrophic cardiomyopathy.  She has coronary artery disease and recently underwent three-vessel bypass surgery at [facility] on 10/4/2023.  She developed acute decompensated diastolic heart failure after bypass surgery and came to [facility] for treatment.  She did have some episodes of rapid atrial fibrillation last summer causing shortness of breath we have not seen any recurrence of atrial fibrillation since her bypass surgery.  She was briefly on amiodarone and is now off that medication.    She is back on atorvastatin with excellent cholesterol numbers.    I will increase her metoprolol XL from 25 to 50 mg a day for additional blood pressure control.    She is having some mild symptoms of congestive heart failure including some bloating and nonproductive cough while lying in bed at night, and her weight is up slightly today to 181 pounds.  I suggested that she take her torsemide on a sliding scale.  She will take torsemide 20 mg daily and potassium chloride 20 mEq daily for weight above 180 pounds.  Between 175 and 179 pounds, she will take torsemide 10 mg daily and potassium  chloride 10 mill equivalents daily.  If her weight is below 175, I will have her hold her torsemide and potassium.    I will have her follow-up with us in core clinic again in 6 months for reevaluation of these issues.  I will see her myself again in 1 year.      AUBREE BOSCH MD     The longitudinal plan of care for the condition(s) below were addressed during this visit. Due to the added complexity in care, I will continue to support Maribel in the subsequent management of this condition(s) and with the ongoing continuity of care of this condition(s).    Problem List Items Addressed This Visit as of 2/21/2024      CHF (congestive heart failure) (H) - Primary    HTN (hypertension)    Hypertrophic cardiomyopathy (H)                Orders this Visit:  Orders Placed This Encounter   Procedures    Basic metabolic panel    Hemoglobin    Basic metabolic panel    Hemoglobin    Lipid Profile    ALT    Follow-Up with [facility]    Follow-Up with Cardiology [facility] (Self)    Echocardiogram Complete     Orders Placed This Encounter   Medications    metoprolol succinate ER (TOPROL XL) 50 MG 24 hr tablet     Sig: Take 1 tablet (50 mg) by mouth daily     Dispense:  90 tablet     Refill:  3     Medications Discontinued During This Encounter   Medication Reason    metoprolol succinate ER (TOPROL XL) 25 MG 24 hr tablet        Today's clinic visit entailed:    45 minutes spent by me on the date of the encounter doing chart review, history and exam, documentation and further activities per the note  Provider  Link to [facility] Help Grid     The level of medical decision making during this visit was of high complexity.           Review of Systems:     Review of Systems:  Skin:  Negative rash;itching;bruising;lumps or bumps   Eyes:  Positive for cataracts  ENT:  Negative nasal congestion;deafness;tinnitus;vertigo;hearing loss  Respiratory:  Positive for cough  Cardiovascular:  Negative;palpitations;chest  "pain;edema;fatigue;lightheadedness Positive for;dizziness  Gastroenterology: Negative poor appetite;nausea;vomiting;heartburn  Genitourinary:  Positive for urinary frequency  Musculoskeletal:  Positive for arthritis  Neurologic:  Negative migraine headaches;seizures;paralysis;numbness or tingling of hands;numbness or tingling of feet  Psychiatric:  Negative excessive stress;sleep disturbances;anxiety;depression  Heme/Lymph/Imm:  Negative anemia;bleeding disorder;night sweats  Endocrine:  Negative thyroid disorder;diabetes            Physical Exam:     Vitals: /82 (BP Location: Left arm, Patient Position: Sitting)   Pulse 64   Ht 1.575 m (5' 2\")   Wt 82.1 kg (181 lb)   SpO2 96%   BMI 33.11 kg/m    Constitutional: Well nourished and in no apparent distress.  Eyes: Pupils equal, round. Sclerae anicteric.   HEENT: Normocephalic, atraumatic.   Neck: Supple. JVD   Respiratory: Breathing non-labored. Lungs clear to auscultation bilaterally. No crackles, wheezes, rhonchi, or rales.  Cardiovascular:  Regular rate and rhythm, normal S1 and S2. No murmur, rub, or gallop.  Skin: Warm, dry. No rashes, cyanosis, or xanthelasma.  Extremities: No edema.  Neurologic: No gross motor deficits. Alert, awake, and oriented to person, place and time.  Psychiatric: Affect appropriate.             Medications:     Current Outpatient Medications   Medication Sig Dispense Refill    acetaminophen (TYLENOL) 500 MG tablet Take 1,000 mg by mouth every 6 hours as needed for mild pain      apixaban ANTICOAGULANT (ELIQUIS) 5 MG tablet Take 1 tablet (5 mg) by mouth 2 times daily 60 tablet 0    apixaban ANTICOAGULANT (ELIQUIS) 5 MG tablet Take 1 tablet (5 mg) by mouth 2 times daily 180 tablet 3    aspirin 81 MG EC tablet Take 81 mg by mouth daily      atorvastatin (LIPITOR) 80 MG tablet Take 1 tablet (80 mg) by mouth daily 90 tablet 3    lisinopril (ZESTRIL) 20 MG tablet Take 1 tablet (20 mg) by mouth daily 90 tablet 1    metoprolol " succinate ER (TOPROL XL) 50 MG 24 hr tablet Take 1 tablet (50 mg) by mouth daily 90 tablet 3    omeprazole (PRILOSEC OTC) 20 MG EC tablet Take 1 tablet (20 mg) by mouth daily 30 tablet 0    polyethylene glycol (MIRALAX) 17 g packet Take 1 packet by mouth daily      potassium chloride ER (KLOR-CON M) 20 MEQ CR tablet Take 1 tablet (20 mEq) by mouth daily as needed for potassium supplementation (only take if needing to take a dose of torsemide) 30 tablet 1    torsemide (DEMADEX) 20 MG tablet Take 1 tablet (20 mg) by mouth daily as needed (Weight gain > 3 pounds overnight, increased swelling or increased shortness of breath) 30 tablet 1    traMADol (ULTRAM) 50 MG tablet Take 1 tablet (50 mg) by mouth every 6 hours as needed for severe pain 20 tablet 0    vitamin B-Complex Take 1 tablet by mouth daily      Vitamin D3 (CHOLECALCIFEROL) 25 mcg (1000 units) tablet Take 25 mcg by mouth daily      ondansetron (ZOFRAN ODT) 4 MG ODT tab Take 4 mg by mouth every 8 hours as needed for nausea (Patient not taking: Reported on 2/21/2024)         No family history on file.    Social History     Socioeconomic History    Marital status:      Spouse name: Not on file    Number of children: Not on file    Years of education: Not on file    Highest education level: Not on file   Occupational History    Not on file   Tobacco Use    Smoking status: Never    Smokeless tobacco: Never   Substance and Sexual Activity    Alcohol use: Yes     Comment: 2-3 drinks a day    Drug use: Not on file    Sexual activity: Not on file   Other Topics Concern    Not on file   Social History Narrative    Not on file     Social Determinants of Health     Financial Resource Strain: Not on file   Food Insecurity: Not on file   Transportation Needs: Not on file   Physical Activity: Not on file   Stress: Not on file   Social Connections: Not on file   Interpersonal Safety: Not on file   Housing Stability: Not on file            Past Medical History:  "    Past Medical History:   Diagnosis Date    Cox's esophagus     CAD (coronary artery disease)     s/p 3-v CABG (LIMA to LAD, SVG to ramus, SVG to diag) on 10/4/23    Hypertrophic cardiomyopathy (H)     Irritable bowel syndrome     Paroxysmal atrial fibrillation (H)               Past Surgical History:     Past Surgical History:   Procedure Laterality Date    UT CABG, ARTERY-VEIN, THREE                Allergies:   Patient has no known allergies.       Data:   All laboratory data reviewed:    Recent Labs   Lab Test 02/16/24  1139   TRIG 81       Lab Results   Component Value Date    WBC 8.7 10/23/2023    RBC 3.75 (L) 10/23/2023    HGB 11.6 (L) 10/23/2023    HCT 36.6 10/23/2023    MCV 98 10/23/2023    MCH 30.9 10/23/2023    MCHC 31.7 10/23/2023    RDW 14.6 10/23/2023     10/23/2023       Lab Results   Component Value Date     (L) 11/20/2023    POTASSIUM 3.8 11/20/2023    CHLORIDE 100 02/16/2024    CO2 25 11/20/2023    ANIONGAP 13 11/20/2023     (H) 11/20/2023    GLC 97 10/18/2023    BUN 29.0 (H) 11/20/2023    CR 1.40 (H) 11/20/2023    GFRESTIMATED 38 (L) 11/20/2023    JUAN 9.7 11/20/2023      No results found for: \"AST\", \"ALT\"    No results found for: \"A1C\"    No results found for: \"INR\"      AUBREE BOSCH MD  [facility]  "

## 2024-02-27 ENCOUNTER — TELEPHONE (OUTPATIENT)
Dept: CARDIOLOGY | Facility: CLINIC | Age: 79
End: 2024-02-27
Payer: MEDICARE

## 2024-02-27 NOTE — TELEPHONE ENCOUNTER
[facility] Call Center    Phone Message    May a detailed message be left on voicemail: yes     Reason for Call: Other: Patient would like a call back to clarify medication.     Action Taken: Other: Cardiology    Travel Screening: Not Applicable    Thank you!  [facility]

## 2024-02-27 NOTE — TELEPHONE ENCOUNTER
I called pt to discuss her medication question. Pt is scheduled for a colonoscopy on 3/18 and needs to know if  is ok with her holding her eliquis for 3 days prior. Pt takes eliquis for hx of afib. Will route update to . Sinai MRAQUEZ February 27, 2024, 2:11 PM

## 2024-03-01 NOTE — TELEPHONE ENCOUNTER
Yes okay to hold Eliquis for 3 days prior to colonoscopy.  Received permission from the gastroenterologist before restarting Eliquis.  Martin Kelly MD

## 2024-03-04 NOTE — TELEPHONE ENCOUNTER
I called pt and let her know that  is ok with her holding eliquis for 3 days prior to her colonoscopy. Pt will resume post colonoscopy when ok with her GI provider. Sinai MARQUEZ March 4, 2024, 10:25 AM

## 2024-05-30 DIAGNOSIS — I50.31 ACUTE HEART FAILURE WITH PRESERVED EJECTION FRACTION (HFPEF) (H): ICD-10-CM

## 2024-05-30 RX ORDER — LISINOPRIL 20 MG/1
20 TABLET ORAL DAILY
Qty: 90 TABLET | Refills: 3 | Status: SHIPPED | OUTPATIENT
Start: 2024-05-30

## 2024-06-13 NOTE — PROGRESS NOTES
10/19/23 0844   Appointment Info   Signing Clinician's Name / Credentials (PT) Jairo Chambers, PT, DPT   Rehab Comments (PT) CABG 10/4/23, sternal precautions   Living Environment   People in Home child(ciaran), adult   Current Living Arrangements apartment   Home Accessibility no concerns  (has elevator)   Transportation Anticipated family or friend will provide   Living Environment Comments Patient lives with adult daughter in apartment, has elevators. Daughter is out of the country until December.   Self-Care   Usual Activity Tolerance good   Current Activity Tolerance fair   Equipment Currently Used at Home cane, straight   Fall history within last six months no   Activity/Exercise/Self-Care Comment Patient reported being IND with mobility and cares at baseline with use of SEC.   General Information   Onset of Illness/Injury or Date of Surgery 10/15/23   Referring Physician Rocky Gunderson MD   Patient/Family Therapy Goals Statement (PT) Would like to get stronger to go home   Pertinent History of Current Problem (include personal factors and/or comorbidities that impact the POC) 77 year old female who was just hospitalized at [facility] (9/29-10/13/23) for NSTEMI requiring 3-v CABG on 10/4 who presented to the ED at [facility] from TCU on 10/15/2023 for evaluation of shortness of breath, and was found to have a mild CHF exacerbation. Attempt to transfer to [facility] from the ED was unsuccessful due to lack of cardiac beds   Existing Precautions/Restrictions cardiac;sternal;fall   Cognition   Affect/Mental Status (Cognition) WFL   Orientation Status (Cognition) oriented x 4   Follows Commands (Cognition) WFL   Pain Assessment   Patient Currently in Pain Yes, see Vital Sign flowsheet  (back and knees)   Integumentary/Edema   Integumentary/Edema Comments 1+ pitting edema in lateral side of foot bilaterally   Strength (Manual Muscle Testing)   Strength (Manual Muscle Testing) Deficits observed  DIAGNOSIS: Trimalleolar fracture of ankle, closed, left, initial encounter.   APPOINTMENT DATE: 06/14/2024   NOTES STATUS DETAILS   DISCHARGE REPORT from the ER Internal 06/10/2024 - [facility] ED   XRAYS (IMAGES & REPORTS) Internal       during functional mobility   Bed Mobility   Comment, (Bed Mobility) mod assist x1 supine to sit   Transfers   Comment, (Transfers) sit<>stand with min assist x1   Gait/Stairs (Locomotion)   Comment, (Gait/Stairs) ambulation with FWW and CGA   Balance   Balance Comments requires BUE support on FWW at this time   Sensory Examination   Sensory Perception WFL   Clinical Impression   Criteria for Skilled Therapeutic Intervention Yes, treatment indicated   PT Diagnosis (PT) impaired mobility, impaired endurance   Influenced by the following impairments impaired activity tolerance, weakness, impaired endurance   Functional limitations due to impairments impaired functional mobility, impaired gait, transfers, bed mobility. Impaired IADL's   Clinical Presentation (PT Evaluation Complexity) stable   Clinical Presentation Rationale clinical judgement   Clinical Decision Making (Complexity) low complexity   Planned Therapy Interventions (PT) balance training;bed mobility training;gait training;transfer training;progressive activity/exercise;home program guidelines;strengthening;stair training;postural re-education;neuromuscular re-education   Risk & Benefits of therapy have been explained evaluation/treatment results reviewed;care plan/treatment goals reviewed;risks/benefits reviewed;current/potential barriers reviewed;participants included;participants voiced agreement with care plan;patient   PT Total Evaluation Time   PT Eval, Low Complexity Minutes (23786) 9   Physical Therapy Goals   PT Frequency 5x/week   PT Predicted Duration/Target Date for Goal Attainment 10/24/23   PT Goals Bed Mobility;Transfers;Gait   PT: Bed Mobility Supervision/stand-by assist;Supine to/from sit   PT: Transfers Supervision/stand-by assist;Sit to/from stand;Assistive device   PT: Gait Supervision/stand-by assist;Rolling walker;Greater than 200 feet   Interventions   Interventions Quick Adds Gait Training;Therapeutic Activity   Therapeutic Activity    Therapeutic Activities: dynamic activities to improve functional performance Minutes (59204) 23   Symptoms Noted During/After Treatment Fatigue;Shortness of breath   Treatment Detail/Skilled Intervention Patient supine in bed at beginning of session, agreeable to participate in PT. Vitals monitored during session, BP at 139/74 mmHg, SpO2 93%, and 62 bpm. Following bout of ambulation at 136/104 mmHg, 62 bpm, SpO2 91%. Provided education to patient following CABG on 10/4/23, packet given and reviewed materials with patient. Educated on sternal precautions. Educated patient on log roll technique for supine<>sit transfers. Performing supine to sit with mod assist x1 and log roll technique. Facilitation and cues throughout transfer to ensure proper form and technique. Patient hugging pillow an performing sit<>stand transfers with min assist x1 and FWW, cues for anterior weight shift. Patient seated in recliner at end of session with call light next to her and all needs within reach. Notified nurse of performance and that patient seated in recliner.   Gait Training   Gait Training Minutes (24400) 10   Symptoms Noted During/After Treatment (Gait Training) fatigue;shortness of breath   Treatment Detail/Skilled Intervention Patient completing bout of ambulation for 150' with FWW and CGA. Decreased step length and gait velocity noted. See therapeutic activity section for vitals. Cues for pursed lip breathing. Two standing rest breaks needed. Ambulating for ~3.5 minutes, effort level at 5/10. No overt LOB throughout.   Distance in Feet 150'   PT Discharge Planning   PT Plan progress ambulation, repeated sit<>stands, IND with bed mobility   PT Discharge Recommendation (DC Rec) Transitional Care Facility   PT Rationale for DC Rec Patient below baseline functional mobility. Lives with daughter in accessible apartment, but daughter will not be back home until December. Currently requires assist for all mobility with FWW.  Recommend TCU for improvement of strength, activity tolerance, and independence with functional mobility.   PT Brief overview of current status mod assist supine<>sit, sit<>stands with min assist x1, ambulation with CGA and FWW   Total Session Time   Timed Code Treatment Minutes 33   Total Session Time (sum of timed and untimed services) 42

## 2024-10-17 ENCOUNTER — CARE COORDINATION (OUTPATIENT)
Dept: CARDIOLOGY | Facility: CLINIC | Age: 79
End: 2024-10-17
Payer: MEDICARE

## 2024-10-17 NOTE — TELEPHONE ENCOUNTER
Yes, okay to hold Eliquis for her nerve injection procedure.  I would recommend holding the Eliquis 3 days prior to the procedure and starting again after the procedure when the bleeding risk is felt to be low.  Martin Kelly MD

## 2024-10-18 NOTE — PROGRESS NOTES
I left a message on the nurse line at [facility] that  is ok with a 3 day eliquis hold prior to pt's injection, no bridging needed. I can't get  to sign the form until next week so need to know if a verbal order is ok.    I received a message back from JIMMY Hoffman that she received the verbal order. They can go ahead and get pt scheduled, but would like the signed form for their records. Will get  to sign the form when I am in clinic on 10/23. Sinai MARQUEZ October 18, 2024, 2:44 PM

## 2024-10-23 NOTE — PROGRESS NOTES
signed [facility] form stating ok to hold eliquis for 3 days prior to injection. Form faxed back to [phone]. Sinai MARQUEZ October 23, 2024, 10:57 AM

## 2024-10-29 ENCOUNTER — DOCUMENTATION ONLY (OUTPATIENT)
Dept: ANTICOAGULATION | Facility: CLINIC | Age: 79
End: 2024-10-29
Payer: MEDICARE

## 2024-10-29 NOTE — PROGRESS NOTES
Anticoagulant Therapeutic Duplication    Duplicate orders identified: identical order(s)    The duplicate anticoagulant order(s) has been discontinued    Active anticoagulant: apixaban (Eliquis)    Plan made per [facility] anticoagulation protocol.    Rhea Schuster RN  10/29/2024

## 2024-11-25 ENCOUNTER — TELEPHONE (OUTPATIENT)
Dept: CARDIOLOGY | Facility: CLINIC | Age: 79
End: 2024-11-25
Payer: MEDICARE

## 2024-11-25 NOTE — TELEPHONE ENCOUNTER
Hi,  Pt saw Dr. Kelly Feb 2024, but orders for [facility] are in from August 2024 but pt didn't call back to schedule.    Dr. Kelly has orders as well to be seen in February 2025.    Question, should pt just see [facility] with echo and labs for February or Dr. Kelly again?    Thanks,    Freda     Pharmacy faxed in a request for prior authorization on:    Medication: Wegovy 1.7 MG/0.75ML injection     Dosage: INJECT 1.7 MG INTO THE SKIN EVERY 7 DAYS     Quantity requested:  Disp-3 mL, R-3     Pharmacy for prescription has been selected.    Initiation of prior authorization needed.

## 2024-12-02 ENCOUNTER — TELEPHONE (OUTPATIENT)
Dept: CARDIOLOGY | Facility: CLINIC | Age: 79
End: 2024-12-02
Payer: MEDICARE

## 2024-12-02 NOTE — TELEPHONE ENCOUNTER
12/2/24 lm/vm (Lets have her see [facility] w/testing, since they will be able to see her on time and she was to have seen [facility] in between MD visits anyway. I discontinued the [facility] follow up and just left the [facility] order so [facility] won't be confused if pt calls back, per JIMMY Mcclain, amh

## 2025-01-02 DIAGNOSIS — I48.0 PAROXYSMAL ATRIAL FIBRILLATION (H): ICD-10-CM

## 2025-02-10 ENCOUNTER — LAB (OUTPATIENT)
Dept: LAB | Facility: CLINIC | Age: 80
End: 2025-02-10
Payer: MEDICARE

## 2025-02-10 ENCOUNTER — HOSPITAL ENCOUNTER (OUTPATIENT)
Dept: CARDIOLOGY | Facility: CLINIC | Age: 80
Discharge: HOME | End: 2025-02-10
Attending: INTERNAL MEDICINE | Admitting: INTERNAL MEDICINE
Payer: MEDICARE

## 2025-02-10 DIAGNOSIS — I50.32 CHRONIC DIASTOLIC CONGESTIVE HEART FAILURE (H): ICD-10-CM

## 2025-02-10 LAB
ALT SERPL W P-5'-P-CCNC: 10 U/L (ref 0–50)
ANION GAP SERPL CALCULATED.3IONS-SCNC: 13 MMOL/L (ref 7–15)
BUN SERPL-MCNC: 9.6 MG/DL (ref 8–23)
CALCIUM SERPL-MCNC: 10.1 MG/DL (ref 8.8–10.4)
CHLORIDE SERPL-SCNC: 96 MMOL/L (ref 98–107)
CHOLEST SERPL-MCNC: 167 MG/DL
CREAT SERPL-MCNC: 0.78 MG/DL (ref 0.51–0.95)
EGFRCR SERPLBLD CKD-EPI 2021: 76 ML/MIN/1.73M2
FASTING STATUS PATIENT QL REPORTED: YES
GLUCOSE SERPL-MCNC: 108 MG/DL (ref 70–99)
HCO3 SERPL-SCNC: 24 MMOL/L (ref 22–29)
HDLC SERPL-MCNC: 72 MG/DL
HGB BLD-MCNC: 13.7 G/DL (ref 11.7–15.7)
LDLC SERPL CALC-MCNC: 73 MG/DL
LVEF ECHO: NORMAL
NONHDLC SERPL-MCNC: 95 MG/DL
POTASSIUM SERPL-SCNC: 4.2 MMOL/L (ref 3.4–5.3)
SODIUM SERPL-SCNC: 133 MMOL/L (ref 135–145)
TRIGL SERPL-MCNC: 108 MG/DL

## 2025-02-10 PROCEDURE — 80061 LIPID PANEL: CPT

## 2025-02-10 PROCEDURE — 80048 BASIC METABOLIC PNL TOTAL CA: CPT

## 2025-02-10 PROCEDURE — 85018 HEMOGLOBIN: CPT

## 2025-02-10 PROCEDURE — 36415 COLL VENOUS BLD VENIPUNCTURE: CPT

## 2025-02-10 PROCEDURE — 255N000002 HC RX 255 OP 636: Performed by: INTERNAL MEDICINE

## 2025-02-10 PROCEDURE — 93306 TTE W/DOPPLER COMPLETE: CPT | Mod: 26 | Performed by: INTERNAL MEDICINE

## 2025-02-10 PROCEDURE — 999N000208 ECHOCARDIOGRAM COMPLETE

## 2025-02-10 PROCEDURE — 84460 ALANINE AMINO (ALT) (SGPT): CPT

## 2025-02-10 RX ADMIN — HUMAN ALBUMIN MICROSPHERES AND PERFLUTREN 9 ML: 10; .22 INJECTION, SOLUTION INTRAVENOUS at 13:36

## 2025-02-17 ENCOUNTER — OFFICE VISIT (OUTPATIENT)
Dept: CARDIOLOGY | Facility: CLINIC | Age: 80
End: 2025-02-17
Payer: MEDICARE

## 2025-02-17 VITALS
HEIGHT: 62 IN | HEART RATE: 63 BPM | WEIGHT: 208.6 LBS | BODY MASS INDEX: 38.39 KG/M2 | DIASTOLIC BLOOD PRESSURE: 66 MMHG | OXYGEN SATURATION: 93 % | SYSTOLIC BLOOD PRESSURE: 116 MMHG

## 2025-02-17 DIAGNOSIS — I50.31 ACUTE HEART FAILURE WITH PRESERVED EJECTION FRACTION (HFPEF) (H): ICD-10-CM

## 2025-02-17 DIAGNOSIS — I50.32 CHRONIC DIASTOLIC CONGESTIVE HEART FAILURE (H): ICD-10-CM

## 2025-02-17 DIAGNOSIS — I50.33 ACUTE ON CHRONIC HEART FAILURE WITH PRESERVED EJECTION FRACTION (HFPEF) (H): Primary | ICD-10-CM

## 2025-02-17 PROCEDURE — G2211 COMPLEX E/M VISIT ADD ON: HCPCS | Performed by: NURSE PRACTITIONER

## 2025-02-17 PROCEDURE — 99214 OFFICE O/P EST MOD 30 MIN: CPT | Performed by: NURSE PRACTITIONER

## 2025-02-17 RX ORDER — CYANOCOBALAMIN (VITAMIN B-12) 500 MCG
400 LOZENGE ORAL DAILY
COMMUNITY

## 2025-02-17 RX ORDER — GUAIFENESIN 600 MG/1
TABLET, EXTENDED RELEASE ORAL DAILY
COMMUNITY

## 2025-02-17 RX ORDER — TORSEMIDE 20 MG/1
20 TABLET ORAL DAILY
Qty: 90 TABLET | Refills: 1 | Status: SHIPPED | OUTPATIENT
Start: 2025-02-17

## 2025-02-17 RX ORDER — POTASSIUM CHLORIDE 1500 MG/1
20 TABLET, EXTENDED RELEASE ORAL DAILY
Qty: 90 TABLET | Refills: 1 | Status: SHIPPED | OUTPATIENT
Start: 2025-02-17

## 2025-02-17 NOTE — PROGRESS NOTES
Cardiology Clinic Follow up     Maribel Buenrostro MRN# [id number]   YOB: 1945 Age: 79 year old     Primary cardiologist: Dr. 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     Seen last in office 2/21/2024 with Dr. Kelly, metoprolol increased to 50mg daily. Given sliding scale for torsemide use (Weight 180lbs or >, torsemide 20mg daily with potassium chloride 20mEq, between 175-179lbs take torsemide 10mg daily and half potassium and if weight <175 hold torsemide/potassium).  Plan 6 month follow up but she presents today for annual visit.     Today, Maribel presents for follow up with her son. Over the past year her weight gradually increased.  The battery in her scale is not currently working and she has not been consistently checking.  She takes torsemide on average about once a week but it has been rather sporadic.  She has had increased lower extremity edema, denies orthopnea or PND.  No significant abdominal bloating.  He is having nocturia.  Notes her energy is down a little more and gets winded a little easier.  she also has severe arthritis in her knees which limits her mobility.  She denies any palpitations or known A-fib recurrence and no chest pain.      She lives independently in [place].  Transportation can be somewhat difficult  to coordinate.  Initial cost of Eliquis was over $500 this year due to her deductible.            Assessment and Plan:     ASSESSMENT:    CAD s/p CABGx 3 (LIMA-LAD, SVG-ramus, SVG-diagonal 10/4/2023 at [facility] with Dr. Giron).  Denies any chest pain. Continue atorvastatin 80mg, metoprolol. No aspirin on Eliquis.       HFpEF - last hospitalized 10/2023 post operatively at [facility] to discharge weight 175 lbs later with mild GRETEL and further weight loss and torsemide changed to PRN with sliding scale based on weight trend to take torsemide if weight > 180lbs. Weight progressively up to 208 lbs this year.  Has only been taking torsemide about once a week and not consistently checking her weight.  She appears mildly volume up on exam today with lower extremity edema, nocturia and notes less energy and a little more short of breath over the past year.  Recommend trial of starting on daily torsemide 20 mg with potassium chloride 20 mEq daily repeat BMP in 1 to 2 weeks with close clinical follow-up with nurse phone check in.     Apical variant hypertrophic CM - Echocardiogram and cMRI evidence for apical HCM. No apical aneurysm.. Continue metoprolol. Family screening.     Moderate pHTN      Trileaflet aortic sclerosis without stenosis  Moderate mitral annular calcification without stenosis    PAF - diagnosed August 2023 at [facility] s/p TYLER guided CDV, later with post op episodes noted after CABG and 2% burden on ZIO, briefly on amiodarone(stopped 11/2023) -denies any palpitations, regular on exam, continues on Eliquis for stroke prevention.     HTN --controlled low normal, he is on metoprolol 50 mg daily and lisinopril 20 mg daily.  With resuming diuretic.     Dyslipidemia -LDL controlled 73 (2/2025)  -continue atorvastatin        PLAN:     Start torsemide 20 mg daily with potassium chloride 20 mill equivalents daily  Repeat BMP labs in 1 to 2 weeks at [facility] -referral slip faxed today  Resume daily  "weights -encouraged to get a battery for her scale  Phone check in in 2 weeks with weights and blood pressures   Follow-up in clinic in 4 to 6 weeks as it is difficult for her to get to clinic appointments       Renu Barker DNP, APRN, CNP  [facility]     Orders this Visit:  Orders Placed This Encounter   Procedures    Follow-Up with Cardiology [place]     Orders Placed This Encounter   Medications    vitamin E 400 units TABS     Sig: Take 400 Units by mouth daily.    multivitamins w/minerals liquid     Sig: Take by mouth daily.    torsemide (DEMADEX) 20 MG tablet     Sig: Take 1 tablet (20 mg) by mouth daily.     Dispense:  90 tablet     Refill:  1    potassium chloride jocelin ER (KLOR-CON M20) 20 MEQ CR tablet     Sig: Take 1 tablet (20 mEq) by mouth daily.     Dispense:  90 tablet     Refill:  1     Medications Discontinued During This Encounter   Medication Reason    torsemide (DEMADEX) 20 MG tablet Reorder (No AVS)    potassium chloride ER (KLOR-CON M) 20 MEQ CR tablet Reorder (No AVS)    aspirin 81 MG EC tablet        Today's clinic visit entailed:  Review of external notes as documented elsewhere in note  Review of the result(s) of each unique test - Echo, cmri, zio, cath, BMP, lipids  Assessment requiring an independent historian(s) - son  Ordering of each unique test  Prescription drug management  The level of medical decision making during this visit was of moderate complexity.           Physical Exam:   Vitals: /66 (BP Location: Right arm, Patient Position: Sitting, Cuff Size: Adult Large)   Pulse 63   Ht 1.575 m (5' 2\")   Wt 94.6 kg (208 lb 9.6 oz)   SpO2 93%   BMI 38.15 kg/m      Body mass index is 38.15 kg/m .  Wt Readings from Last 3 Encounters:   02/17/25 94.6 kg (208 lb 9.6 oz)   02/21/24 82.1 kg (181 lb)   11/20/23 78 kg (172 lb)      General :   Alert and oriented, in no acute distress.    Respiratory:   Respirations unlabored. Clear bilaterally with no rales, " rhonchi, or wheezes.     Cardiovascular:   Rhythm is regular. S1 and S2 are normal. +systolic murmur , difficult to assess JVD due to neck size, abdomen obese   Extremities: Warm and dry, 1-2+ lower edema   Neurologic: Moves all extremities, non focal    Psych:  Appropriate             Medications:     Current Outpatient Medications   Medication Sig Dispense Refill    apixaban ANTICOAGULANT (ELIQUIS) 5 MG tablet Take 1 tablet (5 mg) by mouth 2 times daily. 60 tablet 1    atorvastatin (LIPITOR) 80 MG tablet Take 1 tablet (80 mg) by mouth daily 90 tablet 3    lisinopril (ZESTRIL) 20 MG tablet Take 1 tablet (20 mg) by mouth daily 90 tablet 3    metoprolol succinate ER (TOPROL XL) 50 MG 24 hr tablet Take 1 tablet (50 mg) by mouth daily 90 tablet 3    multivitamins w/minerals liquid Take by mouth daily.      omeprazole (PRILOSEC OTC) 20 MG EC tablet Take 1 tablet (20 mg) by mouth daily 30 tablet 0    ondansetron (ZOFRAN ODT) 4 MG ODT tab Take 4 mg by mouth every 8 hours as needed for nausea.      polyethylene glycol (MIRALAX) 17 g packet Take 1 packet by mouth daily      potassium chloride jocelin ER (KLOR-CON M20) 20 MEQ CR tablet Take 1 tablet (20 mEq) by mouth daily. 90 tablet 1    torsemide (DEMADEX) 20 MG tablet Take 1 tablet (20 mg) by mouth daily. 90 tablet 1    traMADol (ULTRAM) 50 MG tablet Take 1 tablet (50 mg) by mouth every 6 hours as needed for severe pain 20 tablet 0    vitamin B-Complex Take 1 tablet by mouth daily      Vitamin D3 (CHOLECALCIFEROL) 25 mcg (1000 units) tablet Take 25 mcg by mouth daily      vitamin E 400 units TABS Take 400 Units by mouth daily.      acetaminophen (TYLENOL) 500 MG tablet Take 1,000 mg by mouth every 6 hours as needed for mild pain         No family history on file.    Social History     Socioeconomic History    Marital status:      Spouse name: Not on file    Number of children: Not on file    Years of education: Not on file    Highest education level: Not on file    Occupational History    Not on file   Tobacco Use    Smoking status: Never    Smokeless tobacco: Never   Substance and Sexual Activity    Alcohol use: Yes     Comment: 2-3 drinks a day    Drug use: Not on file    Sexual activity: Not on file   Other Topics Concern    Not on file   Social History Narrative    Not on file     Social Drivers of Health     Financial Resource Strain: Low Risk  (9/25/2023)    Received from [facility], [facility]    Financial Resource Strain     Difficulty of Paying Living Expenses: 3     Difficulty of Paying Living Expenses: Not on file   Food Insecurity: No Food Insecurity (9/25/2023)    Received from [facility], [facility]    Food Insecurity     Worried About Running Out of Food in the Last Year: 1   Transportation Needs: No Transportation Needs (9/25/2023)    Received from [facility], [facility]    Transportation Needs     Lack of Transportation (Medical): 1   Physical Activity: Not on file   Stress: Not on file   Social Connections: Socially Integrated (9/25/2023)    Received from [facility], [facility]    Social Connections     Frequency of Communication with Friends and Family: 0   Interpersonal Safety: Not on file   Housing Stability: Low Risk  (9/25/2023)    [facility]    Housing Stability     Unable to Pay for Housing in the Last Year: 1          Past Medical History:     Past Medical History:   Diagnosis Date    Cox's esophagus     CAD (coronary artery disease)     s/p 3-v CABG (LIMA to LAD, SVG to ramus, SVG to diag) on 10/4/23    Hypertrophic cardiomyopathy (H)     Irritable bowel syndrome     Paroxysmal atrial  fibrillation (H)             Past Surgical History:     Past Surgical History:   Procedure Laterality Date    RI CABG, ARTERY-VEIN, THREE              Allergies:   Patient has no known allergies.       Data Reviewed today:   Most Recent Echocardiogram: 2/10/2025  Summary  Left ventricular hypertrophy: asymmetric with no LVOT obstruction  Apical hypertrophy is present suggestive of apical hypertrophic  cardiomyopathy.  The visual ejection fraction is 65-70%.  Grade II or moderate diastolic dysfunction.  The left atrium is moderate to severely dilated.  Right ventricular systolic pressure is elevated, consistent with moderate  pulmonary hypertension.  Contrast was used without apparent complications. There is no comparison study  available.    [facility]: 9/2023 ([facility])  FINAL IMPRESSION   1. Apical hypertrophic cardiomyopathy.   - Severe increase in apical wall thickness (17mm) consistent with apical hypertrophic cardiomyopathy.   - No apical aneurysm/pouch.   - LV systolic function is normal. Quantitative LVEF 65 %.   - Mid myocardial delayed enhancement involving the apical segments. This is a non ischemic pattern. LV scar   size is 6 %.   2. ECV is mildly elevated at 32% suggesting mild diffuse fibrosis.   3. RV cavity size is normal. RV systolic function is normal. Quantitative RVEF 70 %.   4. The mitral valve annulus is dilated. Mitral valve is mildly thickened. There is moderate mitral   regurgitation. Mitral regurgitant volume 35 ml. Mitral regurgitant fraction 38 %.   5. Tricuspid valve leaflets are normal. There is moderate tricuspid regurgitation. Tricuspid regurgitant   volume 28 ml. Tricuspid regurgitant fraction 32 %.   6. Main pulmonary artery is mildly dilated (40mm).  The PA/AO dimension ratio of >1 suggest pulmonary   hypertension.   7. There is a small pericardial effusion. This is not hemodynamically significant.     Coronary angiogram: 10/2023 ([facility])  Conclusions    1. Severe distal left main  "stenosis in the setting of non-ST elevation  myocardial infarction.   Recommendations    * Consult placed to CV surgery.     * Evaluate for coronary artery bypass graft surgery with grafts to the   bifurcating ramus, LAD, diagonal, and OM of the left circumflex.     ZIO: 10/2023  The patient's symptoms / activation of the timestamp feature on the recording   device (once) correlated with atrial fibrillation with RVR.   CONCLUSIONS:   1. Basic rhythm is sinus with flat trends. Average rate 67 bpm. Max rate 96   bpm.   2. Suspect sinus node dysfunction   3. Atrial fibrillation with marked RVR noted.  Overall burden 2%.  Longest   duration was 2 h 39 min.  Average ventricular rate 165 bpm   4. Symptoms reported >>>> atrial fibrillation with RVR.       All laboratory data reviewed:    Recent Labs   Lab Test 02/10/25  1204 02/16/24  1139   LDL 73  --    HDL 72  --    NHDL 95  --    CHOL 167  --    TRIG 108 81       Lab Results   Component Value Date    WBC 8.7 10/23/2023    RBC 3.75 (L) 10/23/2023    HGB 13.7 02/10/2025    HCT 36.6 10/23/2023    MCV 98 10/23/2023    MCH 30.9 10/23/2023    MCHC 31.7 10/23/2023    RDW 14.6 10/23/2023     10/23/2023       Lab Results   Component Value Date     (L) 02/10/2025    POTASSIUM 4.2 02/10/2025    CHLORIDE 96 (L) 02/10/2025    CHLORIDE 100 02/16/2024    CO2 24 02/10/2025    ANIONGAP 13 02/10/2025     (H) 02/10/2025    GLC 97 10/18/2023    BUN 9.6 02/10/2025    CR 0.78 02/10/2025    GFRESTIMATED 76 02/10/2025    JUAN 10.1 02/10/2025      Lab Results   Component Value Date    ALT 10 02/10/2025       No results found for: \"A1C\"    The longitudinal plan of care for Maribel was addressed during this visit. Due to the added complexity in care, I will continue to support Maribel in the subsequent management of this condition(s) and with the ongoing continuity of care of this condition(s).    This note has been dictated using voice recognition software. Any grammatical, " typographical, or context distortions are unintentional and inherent to the software.

## 2025-02-17 NOTE — NURSING NOTE
[facility]    Per SARAH Morin, faxed BMP order to PCP office at fax# [phone] to be done in 1-2 weeks (pt notes she will likely do closer to 2 weeks). Also sent message to board for 2 weeks ~3/3 to call for update on weight/BP/symptoms, and send to Mikel NP with BMP results.     No future appointments.     Susan Rosado RN, BSN  02/17/25 at 2:15 PM

## 2025-02-17 NOTE — PATIENT INSTRUCTIONS
"Call [facility] nurse for any questions or concerns Mon-Fri 8am-4pm  [phone]: Nurse number    [phone]: After Hours urgent Phone Number (choose option 2)      Today, we discussed the following:  Medication changes:   Start back on torsemide 20mg once daily with potassium chloride 20mEq to help get weight and swelling down.     Keep an eye on blood pressure, call if <110 on top number or lightheaded    Follow up:   Labs in 1-2 weeks at [facility] - we will fax lab slip . Make a lab only appointment    Weigh yourself daily to ensure weight coming down a couple lbs a week    Nurse team will check in after labs in 2 weeks to ensure doing okay     Check with insurance company if Xarelto is any cheaper than Eliquis. Check into Medicare part D.     Follow up with Lalitha in 4-6 weeks          Please, remember:   1.  Weigh yourself daily. Call if your weight is up > than 2 pounds in one day, or 5 pounds in one week; if you feel more short of breath or have worsening swelling in your legs or abdomen.  Bring a log of weights to your clinic visits.     2.  Follow the American Heart association diet: Eat a low sodium diet (less than 2,000mg or 2g of salt per day). Try to avoid \"fast food\".  Read food labels to know how much salt is in one serving. There is a lot of hidden salt in cheese, bread, sauces and salad dressings.  Diet is the montalvo to loosing weight first - start with smaller portion sizes.  If you eat less salt, you will retain less fluid.     3.  Avoid alcohol, as this can worsen heart failure.     4.  Avoid NSAIDs as able (For example, Ibuprofen / aleve / advil / naprosen / diclofenac).    5.   Activity:  Aim for routine walking daily or moderate physical activity of 30 minutes, 4 times a week to start.   Take rest breaks to help conserve your energy.   If your legs swell during the day, lay down and elevate feet on pillows for 10 minutes twice a day; consider wearing knee high compression stockings 20-30 " mmHg daily       Thank you for your visit with the [facility] today.   [facility] stands for Cardiomyopathy Optimization Rehabilitation and Education. The CORE clinic will teach and help you to manage your heart failure and help to keep you out of the hospital.       Renu Barker [facility]

## 2025-02-17 NOTE — LETTER
2/17/2025    [facility]  [address] [address]    RE: Maribel Buenrostro       Dear Colleague,     I had the pleasure of seeing Maribel Buenrostro in the [facility].        Cardiology Clinic Follow up     Maribel Buenrostro MRN# [id number]   YOB: 1945 Age: 79 year old     Primary cardiologist: Dr. Kelly     Reason for visit: Annual follow up    History of presenting illness:    Maribel Buenrostro is a pleasant 79 year old female with past medical history significant for HTN, CAD s/p NSTEMI with distal LM stenosis underwent CABG x 3 (LIMA-LAD, SVG-ramus, SVG-diagonal 10/4/2023 at [facility]), paroxysmal atrial fibrillation (diagnosed August 2023 at 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     Seen last in office 2/21/2024 with Dr. eKlly, metoprolol increased to 50mg daily. Given sliding scale for torsemide use (Weight 180lbs or >, torsemide 20mg daily with potassium chloride 20mEq, between 175-179lbs take torsemide 10mg daily and half potassium and if weight <175 hold torsemide/potassium).  Plan 6 month follow up but she presents today for annual visit.     Today, Maribel presents for follow up with her son. Over the past year her weight gradually increased.  The battery in her scale is not currently working and she has not been consistently checking.  She takes torsemide on average about once a week but it has been rather sporadic.  She has had increased lower extremity edema, denies orthopnea or PND.  No significant abdominal bloating.  He is having nocturia.  Notes her energy is down a little more and gets winded a little easier.  she  also has severe arthritis in her knees which limits her mobility.  She denies any palpitations or known A-fib recurrence and no chest pain.      She lives independently in [place].  Transportation can be somewhat difficult to coordinate.  Initial cost of Eliquis was over $500 this year due to her deductible.            Assessment and Plan:     ASSESSMENT:    CAD s/p CABGx 3 (LIMA-LAD, SVG-ramus, SVG-diagonal 10/4/2023 at [facility] with Dr. Giron).  Denies any chest pain. Continue atorvastatin 80mg, metoprolol. No aspirin on Eliquis.       HFpEF - last hospitalized 10/2023 post operatively at [facility] to discharge weight 175 lbs later with mild GRETEL and further weight loss and torsemide changed to PRN with sliding scale based on weight trend to take torsemide if weight > 180lbs. Weight progressively up to 208 lbs this year.  Has only been taking torsemide about once a week and not consistently checking her weight.  She appears mildly volume up on exam today with lower extremity edema, nocturia and notes less energy and a little more short of breath over the past year.  Recommend trial of starting on daily torsemide 20 mg with potassium chloride 20 mEq daily repeat BMP in 1 to 2 weeks with close clinical follow-up with nurse phone check in.     Apical variant hypertrophic CM - Echocardiogram and cMRI evidence for apical HCM. No apical aneurysm.. Continue metoprolol. Family screening.     Moderate pHTN      Trileaflet aortic sclerosis without stenosis  Moderate mitral annular calcification without stenosis    PAF - diagnosed August 2023 at [facility] s/p TYLER guided CDV, later with post op episodes noted after CABG and 2% burden on ZIO, briefly on amiodarone(stopped 11/2023) -denies any palpitations, regular on exam, continues on Eliquis for stroke prevention.     HTN --controlled low normal, he is on metoprolol 50 mg daily and lisinopril 20 mg daily.  With resuming diuretic.     Dyslipidemia -LDL  "controlled 73 (2/2025)  -continue atorvastatin        PLAN:     Start torsemide 20 mg daily with potassium chloride 20 mill equivalents daily  Repeat BMP labs in 1 to 2 weeks at [facility] clinic -referral slip faxed today  Resume daily weights -encouraged to get a battery for her scale  Phone check in in 2 weeks with weights and blood pressures   Follow-up in clinic in 4 to 6 weeks as it is difficult for her to get to clinic appointments       Renu Barker, DNP, APRN, CNP  [facility]     Orders this Visit:  Orders Placed This Encounter   Procedures     Follow-Up with Cardiology [place]     Orders Placed This Encounter   Medications     vitamin E 400 units TABS     Sig: Take 400 Units by mouth daily.     multivitamins w/minerals liquid     Sig: Take by mouth daily.     torsemide (DEMADEX) 20 MG tablet     Sig: Take 1 tablet (20 mg) by mouth daily.     Dispense:  90 tablet     Refill:  1     potassium chloride jocelin ER (KLOR-CON M20) 20 MEQ CR tablet     Sig: Take 1 tablet (20 mEq) by mouth daily.     Dispense:  90 tablet     Refill:  1     Medications Discontinued During This Encounter   Medication Reason     torsemide (DEMADEX) 20 MG tablet Reorder (No AVS)     potassium chloride ER (KLOR-CON M) 20 MEQ CR tablet Reorder (No AVS)     aspirin 81 MG EC tablet        Today's clinic visit entailed:  Review of external notes as documented elsewhere in note  Review of the result(s) of each unique test - Echo, cmri, zio, cath, BMP, lipids  Assessment requiring an independent historian(s) - son  Ordering of each unique test  Prescription drug management  The level of medical decision making during this visit was of moderate complexity.           Physical Exam:   Vitals: /66 (BP Location: Right arm, Patient Position: Sitting, Cuff Size: Adult Large)   Pulse 63   Ht 1.575 m (5' 2\")   Wt 94.6 kg (208 lb 9.6 oz)   SpO2 93%   BMI 38.15 kg/m      Body mass index is 38.15 kg/m .  Wt Readings from " Last 3 Encounters:   02/17/25 94.6 kg (208 lb 9.6 oz)   02/21/24 82.1 kg (181 lb)   11/20/23 78 kg (172 lb)      General :   Alert and oriented, in no acute distress.    Respiratory:   Respirations unlabored. Clear bilaterally with no rales, rhonchi, or wheezes.     Cardiovascular:   Rhythm is regular. S1 and S2 are normal. +systolic murmur , difficult to assess JVD due to neck size, abdomen obese   Extremities: Warm and dry, 1-2+ lower edema   Neurologic: Moves all extremities, non focal    Psych:  Appropriate             Medications:     Current Outpatient Medications   Medication Sig Dispense Refill     apixaban ANTICOAGULANT (ELIQUIS) 5 MG tablet Take 1 tablet (5 mg) by mouth 2 times daily. 60 tablet 1     atorvastatin (LIPITOR) 80 MG tablet Take 1 tablet (80 mg) by mouth daily 90 tablet 3     lisinopril (ZESTRIL) 20 MG tablet Take 1 tablet (20 mg) by mouth daily 90 tablet 3     metoprolol succinate ER (TOPROL XL) 50 MG 24 hr tablet Take 1 tablet (50 mg) by mouth daily 90 tablet 3     multivitamins w/minerals liquid Take by mouth daily.       omeprazole (PRILOSEC OTC) 20 MG EC tablet Take 1 tablet (20 mg) by mouth daily 30 tablet 0     ondansetron (ZOFRAN ODT) 4 MG ODT tab Take 4 mg by mouth every 8 hours as needed for nausea.       polyethylene glycol (MIRALAX) 17 g packet Take 1 packet by mouth daily       potassium chloride jocelin ER (KLOR-CON M20) 20 MEQ CR tablet Take 1 tablet (20 mEq) by mouth daily. 90 tablet 1     torsemide (DEMADEX) 20 MG tablet Take 1 tablet (20 mg) by mouth daily. 90 tablet 1     traMADol (ULTRAM) 50 MG tablet Take 1 tablet (50 mg) by mouth every 6 hours as needed for severe pain 20 tablet 0     vitamin B-Complex Take 1 tablet by mouth daily       Vitamin D3 (CHOLECALCIFEROL) 25 mcg (1000 units) tablet Take 25 mcg by mouth daily       vitamin E 400 units TABS Take 400 Units by mouth daily.       acetaminophen (TYLENOL) 500 MG tablet Take 1,000 mg by mouth every 6 hours as needed for  mild pain         No family history on file.    Social History     Socioeconomic History     Marital status:      Spouse name: Not on file     Number of children: Not on file     Years of education: Not on file     Highest education level: Not on file   Occupational History     Not on file   Tobacco Use     Smoking status: Never     Smokeless tobacco: Never   Substance and Sexual Activity     Alcohol use: Yes     Comment: 2-3 drinks a day     Drug use: Not on file     Sexual activity: Not on file   Other Topics Concern     Not on file   Social History Narrative     Not on file     Social Drivers of Health     Financial Resource Strain: Low Risk  (9/25/2023)    Received from [facility] "[employer]" [facility], [facility]    Financial Resource Strain      Difficulty of Paying Living Expenses: 3      Difficulty of Paying Living Expenses: Not on file   Food Insecurity: No Food Insecurity (9/25/2023)    Received from [facility] "[employer]" [facility], [facility]    Food Insecurity      Worried About Running Out of Food in the Last Year: 1   Transportation Needs: No Transportation Needs (9/25/2023)    Received from [facility] "[employer]" [facility], [facility]    Transportation Needs      Lack of Transportation (Medical): 1   Physical Activity: Not on file   Stress: Not on file   Social Connections: Socially Integrated (9/25/2023)    [facility]" [facility]    Social Connections      Frequency of Communication with Friends and Family: 0   Interpersonal Safety: Not on file   Housing Stability: Low Risk  (9/25/2023)    Received from [facility] "[employer]" [facility], [facility]    Housing Stability      Unable to Pay for Housing in the Last Year: 1           Past Medical History:     Past Medical History:   Diagnosis Date     Cox's esophagus      CAD (coronary artery disease)     s/p 3-v CABG (LIMA to LAD, SVG to ramus, SVG to diag) on 10/4/23     Hypertrophic cardiomyopathy (H)      Irritable bowel syndrome      Paroxysmal atrial fibrillation (H)             Past Surgical History:     Past Surgical History:   Procedure Laterality Date     NM CABG, ARTERY-VEIN, THREE              Allergies:   Patient has no known allergies.       Data Reviewed today:   Most Recent Echocardiogram: 2/10/2025  Summary  Left ventricular hypertrophy: asymmetric with no LVOT obstruction  Apical hypertrophy is present suggestive of apical hypertrophic  cardiomyopathy.  The visual ejection fraction is 65-70%.  Grade II or moderate diastolic dysfunction.  The left atrium is moderate to severely dilated.  Right ventricular systolic pressure is elevated, consistent with moderate  pulmonary hypertension.  Contrast was used without apparent complications. There is no comparison study  available.    cMRI: 9/2023 ([facility])  FINAL IMPRESSION   1. Apical hypertrophic cardiomyopathy.   - Severe increase in apical wall thickness (17mm) consistent with apical hypertrophic cardiomyopathy.   - No apical aneurysm/pouch.   - LV systolic function is normal. Quantitative LVEF 65 %.   - Mid myocardial delayed enhancement involving the apical segments. This is a non ischemic pattern. LV scar   size is 6 %.   2. ECV is mildly elevated at 32% suggesting mild diffuse fibrosis.   3. RV cavity size is normal. RV systolic function is normal. Quantitative RVEF 70 %.   4. The mitral valve annulus is dilated. Mitral valve is mildly thickened. There is moderate mitral   regurgitation. Mitral regurgitant volume 35 ml. Mitral regurgitant fraction 38 %.   5. Tricuspid valve leaflets are normal. There is moderate tricuspid regurgitation. Tricuspid regurgitant   volume 28 ml. Tricuspid regurgitant fraction 32 %.   6. Main  "pulmonary artery is mildly dilated (40mm).  The PA/AO dimension ratio of >1 suggest pulmonary   hypertension.   7. There is a small pericardial effusion. This is not hemodynamically significant.     Coronary angiogram: 10/2023 ([facility])  Conclusions    1. Severe distal left main stenosis in the setting of non-ST elevation  myocardial infarction.   Recommendations    * Consult placed to CV surgery.     * Evaluate for coronary artery bypass graft surgery with grafts to the   bifurcating ramus, LAD, diagonal, and OM of the left circumflex.     ZIO: 10/2023  The patient's symptoms / activation of the timestamp feature on the recording   device (once) correlated with atrial fibrillation with RVR.   CONCLUSIONS:   1. Basic rhythm is sinus with flat trends. Average rate 67 bpm. Max rate 96   bpm.   2. Suspect sinus node dysfunction   3. Atrial fibrillation with marked RVR noted.  Overall burden 2%.  Longest   duration was 2 h 39 min.  Average ventricular rate 165 bpm   4. Symptoms reported >>>> atrial fibrillation with RVR.       All laboratory data reviewed:    Recent Labs   Lab Test 02/10/25  1204 02/16/24  1139   LDL 73  --    HDL 72  --    NHDL 95  --    CHOL 167  --    TRIG 108 81       Lab Results   Component Value Date    WBC 8.7 10/23/2023    RBC 3.75 (L) 10/23/2023    HGB 13.7 02/10/2025    HCT 36.6 10/23/2023    MCV 98 10/23/2023    MCH 30.9 10/23/2023    MCHC 31.7 10/23/2023    RDW 14.6 10/23/2023     10/23/2023       Lab Results   Component Value Date     (L) 02/10/2025    POTASSIUM 4.2 02/10/2025    CHLORIDE 96 (L) 02/10/2025    CHLORIDE 100 02/16/2024    CO2 24 02/10/2025    ANIONGAP 13 02/10/2025     (H) 02/10/2025    GLC 97 10/18/2023    BUN 9.6 02/10/2025    CR 0.78 02/10/2025    GFRESTIMATED 76 02/10/2025    JUAN 10.1 02/10/2025      Lab Results   Component Value Date    ALT 10 02/10/2025       No results found for: \"A1C\"    The longitudinal plan of care for Maribel was addressed " during this visit. Due to the added complexity in care, I will continue to support Maribel in the subsequent management of this condition(s) and with the ongoing continuity of care of this condition(s).    This note has been dictated using voice recognition software. Any grammatical, typographical, or context distortions are unintentional and inherent to the software.      Thank you for allowing me to participate in the care of your patient.      Sincerely,     DESIRAE Rondon [facility]  cc:   Martin Kelly MD  [address] [place] [id number]

## 2025-03-26 ENCOUNTER — PATIENT OUTREACH (OUTPATIENT)
Dept: CARDIOLOGY | Facility: CLINIC | Age: 80
End: 2025-03-26
Payer: MEDICARE

## 2025-03-26 NOTE — PROGRESS NOTES
[facility]: Heart Failure Care Coordination   Follow-Up Outreach      Situation/Background:       2/17/25 Office visit with Lalitha Barker  Follow up after visit: Watch for BMP at PCP clinic, when get labs, call for update on weight/sx/BP and send to [facility] per request      Current diuretic: Torsemide 20mg daily  PRN diuretic plan: none  Current potassium chloride dose: 20meq daily  Other pertinent information:      Assessment:         3/26/25 Called [facility] to have lab work ordered by Lalitha Barker faxed to [facility] for follow up on. Scheduling was able to confirm that she has a lab appointment for April 2nd and we will check back in on April 3rd for results.      Vivian WOOD, RN  3:27 PM 3/11/2025  Nurse line: MARSHA 8am-4p [phone]

## 2025-04-08 DIAGNOSIS — I48.0 PAROXYSMAL ATRIAL FIBRILLATION (H): ICD-10-CM

## 2025-04-21 ENCOUNTER — VIRTUAL VISIT (OUTPATIENT)
Dept: CARDIOLOGY | Facility: CLINIC | Age: 80
End: 2025-04-21
Attending: NURSE PRACTITIONER
Payer: MEDICARE

## 2025-04-21 VITALS
BODY MASS INDEX: 37.86 KG/M2 | HEART RATE: 68 BPM | SYSTOLIC BLOOD PRESSURE: 138 MMHG | DIASTOLIC BLOOD PRESSURE: 70 MMHG | WEIGHT: 207 LBS | OXYGEN SATURATION: 96 %

## 2025-04-21 DIAGNOSIS — I50.33 ACUTE ON CHRONIC HEART FAILURE WITH PRESERVED EJECTION FRACTION (HFPEF) (H): Primary | ICD-10-CM

## 2025-04-21 DIAGNOSIS — I25.10 CORONARY ARTERY DISEASE INVOLVING NATIVE CORONARY ARTERY OF NATIVE HEART WITHOUT ANGINA PECTORIS: ICD-10-CM

## 2025-04-21 NOTE — PROGRESS NOTES
Cardiology Clinic Follow up     Maribel Buenrostro MRN# [id number]   YOB: 1945 Age: 79 year old     Primary cardiologist: Dr. Kelly     Reason for visit: 2 month virtual follow up     Phone start time: 1024  Video end time: 1037  Originating Location (pt. Location): home  Distant Location (provider location):  [facility]   Mode of Communication:  Phone conference. Doximity not connecting.       History of presenting illness:    Maribel Buenrostro is a pleasant 79 year old female with past medical history significant for HTN, CAD s/p NSTEMI with distal LM stenosis underwent CABG x 3 (LIMA-LAD, SVG-ramus, SVG-diagonal 10/4/2023 at [facility]), paroxysmal atrial fibrillation (diagnosed August 2023 at [facility] s/p TYLER guided CDV, later with post op episodes noted, briefly on amiodarone), apical hypertrophic cardiomyopathy (diagnosed August 2023), HFpEF (required post op hospitalization 10/2023 with acute heart failure), obesity, history of daily alcohol use, gastroesophageal reflux disease, arthritis who presents today for routine follow up.     Seen in office 2/21/2024 with Dr. Kelly, metoprolol increased to 50mg daily. Given sliding scale for torsemide use (Weight 180lbs or >, torsemide 20mg daily with potassium chloride 20mEq, between 175-179lbs take torsemide 10mg daily and half potassium and if weight <175 hold torsemide/potassium).       She was seen last in follow up with myself 2/2025. Over the past year her weight gradually increased to 208 lb (previously 181). Sporadic use of torsemide about once a week.   Noted increased lower extremity edema and getting winded a little easier. Poor mobility with joints. Torsemide 20mg daily resumed.     Today, Maribel presents for follow up via phone due to error in doximity application for provider. She only has been taking torsemide every other day due to urinary frequency. Edema has improved some in the  mornings.  Weight unchanged at 207 lbs. /70. She isn't very active due to her back pain and severe knee arthritis and limited mobility. She has had trouble losing weight. No orthopnea/PND. No chest pain.     She lives independently in [place].  Transportation can be somewhat difficult to coordinate.            Assessment and Plan:     ASSESSMENT:    CAD s/p CABGx 3 (LIMA-LAD, SVG-ramus, SVG-diagonal 10/4/2023 at [facility] with Dr. Giron).    -Denies any chest pain. Continue atorvastatin 80mg, metoprolol. No aspirin on Eliquis.       HFpEF   -Last hospitalized 10/2023 post operatively from CABG at [facility] to discharge weight 175 lbs later with mild GRETEL and further weight loss and torsemide changed to PRN with sliding scale based on weight trend to take torsemide if weight > 180lbs. Weight progressively up to 208 lbs this year, wasn't weighing herself consistently at home.  Unclear what proportion is from HFpEF vs weight gain from sedentary lifestyle.  Was only taking torsemide about once a week sporadically prior to last visit. In February advised to resume torsemide 20mg daily but only taking every other day. She is agreeable to starting daily dosing. BMP stable last week Creat 0.82, K 4.3 Patient will call with weight check in 1 week. If weight not reducing then plan to increase torsemide to 40mg.      Apical variant hypertrophic CM - Echocardiogram and cMRI evidence for apical HCM. No apical aneurysm..  -Continue metoprolol. Family screening.     Moderate pHTN      Trileaflet aortic sclerosis without stenosis  Moderate mitral annular calcification without stenosis  -Repeat Echo in 1-2 years pending clinical assessment     PAF - diagnosed August 2023 at  s/p TYLER guided CDV, later with post op episodes noted after CABG and 2% burden on ZIO, briefly on amiodarone(stopped 11/2023)   -Denies any palpitations, regular on exam, continues on Eliquis for stroke prevention.     HTN   -Continue  on metoprolol 50 mg daily, lisinopril 20 mg daily, torsemide as above    Dyslipidemia  -LDL controlled 73 (2/2025)  -continue atorvastatin    10.  Obesity  -Follow up with PCP if efforts at weight loss remain unsuccessful. Consider GLP-1 if appropriate.        PLAN:     Increase torsemide 20 mg daily with potassium chloride 20 mEq daily  Phone check in with RN team in 1-2 weeks  If no decrease in weight by 3 lbs next week to increase torsemide to 40mg daily and increase potassium chloride to 20mEq twice daily and repeat BMP, BNP in 1-2 weeks   Continue daily weights, leg elevation  Follow up with PCP to discuss obesity and if GLP-1 medications are an option for her for weight loss  Follow-up in clinic in 4 weeks, okay for virtual as it is difficult for her to get to clinic appointments       Renu Barker, DNP, APRN, CNP  [facility]     Orders this Visit:  Orders Placed This Encounter   Procedures    Follow-Up with Cardiology- [place]     No orders of the defined types were placed in this encounter.    There are no discontinued medications.      Today's clinic visit entailed:  Review of external notes as documented elsewhere in note  Review of the result(s) of each unique test - Echo, BMP,   Prescription drug management  The level of medical decision making during this visit was of moderate complexity.    I spent 30 minutes on the date of encounter of which  (10+) minutes spent in medical discussion.  Reason why video was not performed:  Video connection was lost and the majority of the visit was completed via audio only.           Physical Exam:   Vitals: /70   Pulse 68   Wt 93.9 kg (207 lb)   SpO2 96%   BMI 37.86 kg/m      Body mass index is 37.86 kg/m .  Wt Readings from Last 3 Encounters:   04/21/25 93.9 kg (207 lb)   02/17/25 94.6 kg (208 lb 9.6 oz)   02/21/24 82.1 kg (181 lb)      Physical Exam:  A limited exam was conducted via video.  Constitutional:  Patient is pleasant,  alert, cooperative, and in NAD.           Medications:     Current Outpatient Medications   Medication Sig Dispense Refill    acetaminophen (TYLENOL) 500 MG tablet Take 1,000 mg by mouth every 6 hours as needed for mild pain      apixaban ANTICOAGULANT (ELIQUIS) 5 MG tablet Take 1 tablet (5 mg) by mouth 2 times daily. 60 tablet 11    atorvastatin (LIPITOR) 80 MG tablet Take 1 tablet (80 mg) by mouth daily 90 tablet 3    lisinopril (ZESTRIL) 20 MG tablet Take 1 tablet (20 mg) by mouth daily 90 tablet 3    metoprolol succinate ER (TOPROL XL) 50 MG 24 hr tablet Take 1 tablet (50 mg) by mouth daily 90 tablet 3    multivitamins w/minerals liquid Take by mouth daily.      omeprazole (PRILOSEC OTC) 20 MG EC tablet Take 1 tablet (20 mg) by mouth daily 30 tablet 0    ondansetron (ZOFRAN ODT) 4 MG ODT tab Take 4 mg by mouth every 8 hours as needed for nausea.      polyethylene glycol (MIRALAX) 17 g packet Take 1 packet by mouth daily      potassium chloride jocelin ER (KLOR-CON M20) 20 MEQ CR tablet Take 1 tablet (20 mEq) by mouth daily. 90 tablet 1    torsemide (DEMADEX) 20 MG tablet Take 1 tablet (20 mg) by mouth daily. 90 tablet 1    traMADol (ULTRAM) 50 MG tablet Take 1 tablet (50 mg) by mouth every 6 hours as needed for severe pain 20 tablet 0    vitamin B-Complex Take 1 tablet by mouth daily      Vitamin D3 (CHOLECALCIFEROL) 25 mcg (1000 units) tablet Take 25 mcg by mouth daily      vitamin E 400 units TABS Take 400 Units by mouth daily.         No family history on file.    Social History     Socioeconomic History    Marital status:      Spouse name: Not on file    Number of children: Not on file    Years of education: Not on file    Highest education level: Not on file   Occupational History    Not on file   Tobacco Use    Smoking status: Never    Smokeless tobacco: Never   Substance and Sexual Activity    Alcohol use: Yes     Comment: 2-3 drinks a day    Drug use: Not on file    Sexual activity: Not on file    Other Topics Concern    Not on file   Social History Narrative    Not on file     Social Drivers of Health     Financial Resource Strain: Low Risk  (9/25/2023)    Received from [facility] [employer] [facility] [employer] [facility]    Financial Resource Strain     Difficulty of Paying Living Expenses: 3     Difficulty of Paying Living Expenses: Not on file   Food Insecurity: No Food Insecurity (9/25/2023)    Received from [facility] [employer] [facility] [employer] [facility]    Food Insecurity     Worried About Running Out of Food in the Last Year: 1   Transportation Needs: No Transportation Needs (9/25/2023)    Received from [facility] [employer] [email or web address]    Transportation Needs     Lack of Transportation (Medical): 1   Physical Activity: Not on file   Stress: Not on file   Social Connections: Socially Integrated (9/25/2023)    Received from [facility] [employer] [email or web address] [employer] [facility]    Social Connections     Frequency of Communication with Friends and Family: 0   Interpersonal Safety: Not on file   Housing Stability: Low Risk  (9/25/2023)    Received from [facility] [employer] [email or web address]    Housing Stability     Unable to Pay for Housing in the Last Year: 1          Past Medical History:     Past Medical History:   Diagnosis Date    Cox's esophagus     CAD (coronary artery disease)     s/p 3-v CABG (LIMA to LAD, SVG to ramus, SVG to diag) on 10/4/23    Hypertrophic cardiomyopathy (H)     Irritable bowel syndrome     Paroxysmal atrial fibrillation (H)             Past Surgical History:     Past Surgical History:   Procedure Laterality Date    [place] CABG, ARTERY-VEIN, THREE              Allergies:   Patient has no known allergies.       Data Reviewed today:   Most Recent  Echocardiogram: 2/10/2025  Summary  Left ventricular hypertrophy: asymmetric with no LVOT obstruction  Apical hypertrophy is present suggestive of apical hypertrophic  cardiomyopathy.  The visual ejection fraction is 65-70%.  Grade II or moderate diastolic dysfunction.  The left atrium is moderate to severely dilated.  Right ventricular systolic pressure is elevated, consistent with moderate  pulmonary hypertension.  Contrast was used without apparent complications. There is no comparison study  available.    cMRI: 9/2023 ([facility])  FINAL IMPRESSION   1. Apical hypertrophic cardiomyopathy.   - Severe increase in apical wall thickness (17mm) consistent with apical hypertrophic cardiomyopathy.   - No apical aneurysm/pouch.   - LV systolic function is normal. Quantitative LVEF 65 %.   - Mid myocardial delayed enhancement involving the apical segments. This is a non ischemic pattern. LV scar   size is 6 %.   2. ECV is mildly elevated at 32% suggesting mild diffuse fibrosis.   3. RV cavity size is normal. RV systolic function is normal. Quantitative RVEF 70 %.   4. The mitral valve annulus is dilated. Mitral valve is mildly thickened. There is moderate mitral   regurgitation. Mitral regurgitant volume 35 ml. Mitral regurgitant fraction 38 %.   5. Tricuspid valve leaflets are normal. There is moderate tricuspid regurgitation. Tricuspid regurgitant   volume 28 ml. Tricuspid regurgitant fraction 32 %.   6. Main pulmonary artery is mildly dilated (40mm).  The PA/AO dimension ratio of >1 suggest pulmonary   hypertension.   7. There is a small pericardial effusion. This is not hemodynamically significant.     Coronary angiogram: 10/2023 ([facility])  Conclusions    1. Severe distal left main stenosis in the setting of non-ST elevation  myocardial infarction.   Recommendations    * Consult placed to CV surgery.     * Evaluate for coronary artery bypass graft surgery with grafts to the   bifurcating ramus, LAD, diagonal, and  "OM of the left circumflex.     ZIO: 10/2023  The patient's symptoms / activation of the timestamp feature on the recording   device (once) correlated with atrial fibrillation with RVR.   CONCLUSIONS:   1. Basic rhythm is sinus with flat trends. Average rate 67 bpm. Max rate 96   bpm.   2. Suspect sinus node dysfunction   3. Atrial fibrillation with marked RVR noted.  Overall burden 2%.  Longest   duration was 2 h 39 min.  Average ventricular rate 165 bpm   4. Symptoms reported >>>> atrial fibrillation with RVR.       All laboratory data reviewed:    Recent Labs   Lab Test 02/10/25  1204 02/16/24  1139   LDL 73  --    HDL 72  --    NHDL 95  --    CHOL 167  --    TRIG 108 81       Lab Results   Component Value Date    WBC 8.7 10/23/2023    RBC 3.75 (L) 10/23/2023    HGB 13.7 02/10/2025    HCT 36.6 10/23/2023    MCV 98 10/23/2023    MCH 30.9 10/23/2023    MCHC 31.7 10/23/2023    RDW 14.6 10/23/2023     10/23/2023       Lab Results   Component Value Date     (L) 02/10/2025    POTASSIUM 4.2 02/10/2025    CHLORIDE 100 04/15/2025    CO2 24 02/10/2025    ANIONGAP 13 02/10/2025     (H) 02/10/2025    GLC 97 10/18/2023    BUN 9.6 02/10/2025    CR 0.78 02/10/2025    GFRESTIMATED 76 02/10/2025    JUAN 10.1 02/10/2025      Lab Results   Component Value Date    ALT 10 02/10/2025       No results found for: \"A1C\"    The longitudinal plan of care for Maribel was addressed during this visit. Due to the added complexity in care, I will continue to support Maribel in the subsequent management of this condition(s) and with the ongoing continuity of care of this condition(s).    This note has been dictated using voice recognition software. Any grammatical, typographical, or context distortions are unintentional and inherent to the software.  "

## 2025-04-21 NOTE — LETTER
4/21/2025    [facility]  [address] [address]    RE: Maribel Buenrostro       Dear Colleague,     I had the pleasure of seeing Maribel Buenrostro in the [facility].  Maribel is a 79 year old who is being evaluated via a billable video visit.    What phone number would you like to be contacted at? [phone]  How would you like to obtain your AVS? Mail a copy    Video-Visit Details    Type of service:  Video Visit - changed to Phone only   Video End Time: 1037  Originating Location (pt. Location): Home    Distant Location (provider location):  On-site  Platform used for Video Visit: Unable to complete video visit - phone visit           Cardiology Clinic Follow up     Maribel Buenrostro MRN# [id number]   YOB: 1945 Age: 79 year old     Primary cardiologist: Dr. Kelly     Reason for visit: 2 month virtual follow up     Phone start time: 1024  Video end time: 1037  Originating Location (pt. Location): home  Distant Location (provider location):  [facility]   Mode of Communication:  Phone conference. Doximity not connecting.       History of presenting illness:    Maribel Buenrostro is a pleasant 79 year old female with past medical history significant for HTN, CAD s/p NSTEMI with distal LM stenosis underwent CABG x 3 (LIMA-LAD, SVG-ramus, SVG-diagonal 10/4/2023 at [facility]), paroxysmal atrial fibrillation (diagnosed August 2023 at [facility] s/p TYLER guided CDV, later with post op episodes noted, briefly on amiodarone), apical hypertrophic cardiomyopathy (diagnosed August 2023), HFpEF (required post op hospitalization 10/2023 with acute heart failure), obesity, history of daily alcohol use, gastroesophageal reflux disease, arthritis who presents today for routine follow up.     Seen in office 2/21/2024 with Dr. Kelly, metoprolol increased to 50mg daily. Given sliding scale for torsemide use (Weight 180lbs  or >, torsemide 20mg daily with potassium chloride 20mEq, between 175-179lbs take torsemide 10mg daily and half potassium and if weight <175 hold torsemide/potassium).       She was seen last in follow up with myself 2/2025. Over the past year her weight gradually increased to 208 lb (previously 181). Sporadic use of torsemide about once a week.   Noted increased lower extremity edema and getting winded a little easier. Poor mobility with joints. Torsemide 20mg daily resumed.     Today, Maribel presents for follow up via phone due to error in [employer] application for provider. She only has been taking torsemide every other day due to urinary frequency. Edema has improved some in the mornings.  Weight unchanged at 207 lbs. /70. She isn't very active due to her back pain and severe knee arthritis and limited mobility. She has had trouble losing weight. No orthopnea/PND. No chest pain.     She lives independently in [place].  Transportation can be somewhat difficult to coordinate.            Assessment and Plan:     ASSESSMENT:    CAD s/p CABGx 3 (LIMA-LAD, SVG-ramus, SVG-diagonal 10/4/2023 at [facility] with Dr. Giron).    -Denies any chest pain. Continue atorvastatin 80mg, metoprolol. No aspirin on Eliquis.       HFpEF   -Last hospitalized 10/2023 post operatively from CABG at [facility] to discharge weight 175 lbs later with mild GRETEL and further weight loss and torsemide changed to PRN with sliding scale based on weight trend to take torsemide if weight > 180lbs. Weight progressively up to 208 lbs this year, wasn't weighing herself consistently at home.  Unclear what proportion is from HFpEF vs weight gain from sedentary lifestyle.  Was only taking torsemide about once a week sporadically prior to last visit. In February advised to resume torsemide 20mg daily but only taking every other day. She is agreeable to starting daily dosing. BMP stable last week Creat 0.82, K 4.3 Patient will call with weight  check in 1 week. If weight not reducing then plan to increase torsemide to 40mg.      Apical variant hypertrophic CM - Echocardiogram and cMRI evidence for apical HCM. No apical aneurysm..  -Continue metoprolol. Family screening.     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     HTN   -Continue on metoprolol 50 mg daily, lisinopril 20 mg daily, torsemide as above    Dyslipidemia  -LDL controlled 73 (2/2025)  -continue atorvastatin    10.  Obesity  -Follow up with PCP if efforts at weight loss remain unsuccessful. Consider GLP-1 if appropriate.        PLAN:     Increase torsemide 20 mg daily with potassium chloride 20 mEq daily  Phone check in with RN team in 1-2 weeks  If no decrease in weight by 3 lbs next week to increase torsemide to 40mg daily and increase potassium chloride to 20mEq twice daily and repeat BMP, BNP in 1-2 weeks   Continue daily weights, leg elevation  Follow up with PCP to discuss obesity and if GLP-1 medications are an option for her for weight loss  Follow-up in clinic in 4 weeks, okay for virtual as it is difficult for her to get to clinic appointments       Renu Barker, DNP, APRN, CNP  [facility] [place]     Orders this Visit:  Orders Placed This Encounter   Procedures     Follow-Up with Cardiology- [place]     No orders of the defined types were placed in this encounter.    There are no discontinued medications.      Today's clinic visit entailed:  Review of external notes as documented elsewhere in note  Review of the result(s) of each unique test - Echo, BMP,   Prescription  drug management  The level of medical decision making during this visit was of moderate complexity.    I spent 30 minutes on the date of encounter of which  (10+) minutes spent in medical discussion.  Reason why video was not performed:  Video connection was lost and the majority of the visit was completed via audio only.           Physical Exam:   Vitals: /70   Pulse 68   Wt 93.9 kg (207 lb)   SpO2 96%   BMI 37.86 kg/m      Body mass index is 37.86 kg/m .  Wt Readings from Last 3 Encounters:   04/21/25 93.9 kg (207 lb)   02/17/25 94.6 kg (208 lb 9.6 oz)   02/21/24 82.1 kg (181 lb)      Physical Exam:  A limited exam was conducted via video.  Constitutional:  Patient is pleasant, alert, cooperative, and in NAD.           Medications:     Current Outpatient Medications   Medication Sig Dispense Refill     acetaminophen (TYLENOL) 500 MG tablet Take 1,000 mg by mouth every 6 hours as needed for mild pain       apixaban ANTICOAGULANT (ELIQUIS) 5 MG tablet Take 1 tablet (5 mg) by mouth 2 times daily. 60 tablet 11     atorvastatin (LIPITOR) 80 MG tablet Take 1 tablet (80 mg) by mouth daily 90 tablet 3     lisinopril (ZESTRIL) 20 MG tablet Take 1 tablet (20 mg) by mouth daily 90 tablet 3     metoprolol succinate ER (TOPROL XL) 50 MG 24 hr tablet Take 1 tablet (50 mg) by mouth daily 90 tablet 3     multivitamins w/minerals liquid Take by mouth daily.       omeprazole (PRILOSEC OTC) 20 MG EC tablet Take 1 tablet (20 mg) by mouth daily 30 tablet 0     ondansetron (ZOFRAN ODT) 4 MG ODT tab Take 4 mg by mouth every 8 hours as needed for nausea.       polyethylene glycol (MIRALAX) 17 g packet Take 1 packet by mouth daily       potassium chloride jocelin ER (KLOR-CON M20) 20 MEQ CR tablet Take 1 tablet (20 mEq) by mouth daily. 90 tablet 1     torsemide (DEMADEX) 20 MG tablet Take 1 tablet (20 mg) by mouth daily. 90 tablet 1     traMADol (ULTRAM) 50 MG tablet Take 1 tablet (50 mg) by mouth every 6 hours as needed for severe  pain 20 tablet 0     vitamin B-Complex Take 1 tablet by mouth daily       Vitamin D3 (CHOLECALCIFEROL) 25 mcg (1000 units) tablet Take 25 mcg by mouth daily       vitamin E 400 units TABS Take 400 Units by mouth daily.         No family history on file.    Social History     Socioeconomic History     Marital status:      Spouse name: Not on file     Number of children: Not on file     Years of education: Not on file     Highest education level: Not on file   Occupational History     Not on file   Tobacco Use     Smoking status: Never     Smokeless tobacco: Never   Substance and Sexual Activity     Alcohol use: Yes     Comment: 2-3 drinks a day     Drug use: Not on file     Sexual activity: Not on file   Other Topics Concern     Not on file   Social History Narrative     Not on file     Social Drivers of Health     Financial Resource Strain: Low Risk  (9/25/2023)    [facility], [facility]    Financial Resource Strain      Difficulty of Paying Living Expenses: 3      Difficulty of Paying Living Expenses: Not on file   Food Insecurity: No Food Insecurity (9/25/2023)    [facility], [facility]    Food Insecurity      Worried About Running Out of Food in the Last Year: 1   Transportation Needs: No Transportation Needs (9/25/2023)    Received from [facility], [facility]    Transportation Needs      Lack of Transportation (Medical): 1   Physical Activity: Not on file   Stress: Not on file   Social Connections: Socially Integrated (9/25/2023)    [facility], [facility]    Social Connections      Frequency of Communication with Friends and Family: 0   Interpersonal Safety: Not on file   Housing Stability: Low  Risk  (9/25/2023)    [facility] from [facility], [facility] [employer] [place] & [facility]    Housing Stability      Unable to Pay for Housing in the Last Year: 1          Past Medical History:     Past Medical History:   Diagnosis Date     Cox's esophagus      CAD (coronary artery disease)     s/p 3-v CABG (LIMA to LAD, SVG to ramus, SVG to diag) on 10/4/23     Hypertrophic cardiomyopathy (H)      Irritable bowel syndrome      Paroxysmal atrial fibrillation (H)             Past Surgical History:     Past Surgical History:   Procedure Laterality Date     ID CABG, ARTERY-VEIN, THREE              Allergies:   Patient has no known allergies.       Data Reviewed today:   Most Recent Echocardiogram: 2/10/2025  Summary  Left ventricular hypertrophy: asymmetric with no LVOT obstruction  Apical hypertrophy is present suggestive of apical hypertrophic  cardiomyopathy.  The visual ejection fraction is 65-70%.  Grade II or moderate diastolic dysfunction.  The left atrium is moderate to severely dilated.  Right ventricular systolic pressure is elevated, consistent with moderate  pulmonary hypertension.  Contrast was used without apparent complications. There is no comparison study  available.    cMRI: 9/2023 ([facility])  FINAL IMPRESSION   1. Apical hypertrophic cardiomyopathy.   - Severe increase in apical wall thickness (17mm) consistent with apical hypertrophic cardiomyopathy.   - No apical aneurysm/pouch.   - LV systolic function is normal. Quantitative LVEF 65 %.   - Mid myocardial delayed enhancement involving the apical segments. This is a non ischemic pattern. LV scar   size is 6 %.   2. ECV is mildly elevated at 32% suggesting mild diffuse fibrosis.   3. RV cavity size is normal. RV systolic function is normal. Quantitative RVEF 70 %.   4. The mitral valve annulus is dilated. Mitral valve is mildly thickened. There is moderate mitral   regurgitation. Mitral regurgitant volume 35 ml.  Mitral regurgitant fraction 38 %.   5. Tricuspid valve leaflets are normal. There is moderate tricuspid regurgitation. Tricuspid regurgitant   volume 28 ml. Tricuspid regurgitant fraction 32 %.   6. Main pulmonary artery is mildly dilated (40mm).  The PA/AO dimension ratio of >1 suggest pulmonary   hypertension.   7. There is a small pericardial effusion. This is not hemodynamically significant.     Coronary angiogram: 10/2023 ([facility])  Conclusions    1. Severe distal left main stenosis in the setting of non-ST elevation  myocardial infarction.   Recommendations    * Consult placed to CV surgery.     * Evaluate for coronary artery bypass graft surgery with grafts to the   bifurcating ramus, LAD, diagonal, and OM of the left circumflex.     ZIO: 10/2023  The patient's symptoms / activation of the timestamp feature on the recording   device (once) correlated with atrial fibrillation with RVR.   CONCLUSIONS:   1. Basic rhythm is sinus with flat trends. Average rate 67 bpm. Max rate 96   bpm.   2. Suspect sinus node dysfunction   3. Atrial fibrillation with marked RVR noted.  Overall burden 2%.  Longest   duration was 2 h 39 min.  Average ventricular rate 165 bpm   4. Symptoms reported >>>> atrial fibrillation with RVR.       All laboratory data reviewed:    Recent Labs   Lab Test 02/10/25  1204 02/16/24  1139   LDL 73  --    HDL 72  --    NHDL 95  --    CHOL 167  --    TRIG 108 81       Lab Results   Component Value Date    WBC 8.7 10/23/2023    RBC 3.75 (L) 10/23/2023    HGB 13.7 02/10/2025    HCT 36.6 10/23/2023    MCV 98 10/23/2023    MCH 30.9 10/23/2023    MCHC 31.7 10/23/2023    RDW 14.6 10/23/2023     10/23/2023       Lab Results   Component Value Date     (L) 02/10/2025    POTASSIUM 4.2 02/10/2025    CHLORIDE 100 04/15/2025    CO2 24 02/10/2025    ANIONGAP 13 02/10/2025     (H) 02/10/2025    GLC 97 10/18/2023    BUN 9.6 02/10/2025    CR 0.78 02/10/2025    GFRESTIMATED 76 02/10/2025     "JUAN 10.1 02/10/2025      Lab Results   Component Value Date    ALT 10 02/10/2025       No results found for: \"A1C\"    The longitudinal plan of care for Maribel was addressed during this visit. Due to the added complexity in care, I will continue to support Maribel in the subsequent management of this condition(s) and with the ongoing continuity of care of this condition(s).    This note has been dictated using voice recognition software. Any grammatical, typographical, or context distortions are unintentional and inherent to the software.      Thank you for allowing me to participate in the care of your patient.      Sincerely,     DESIRAE Rondon CNP     [facility]  cc:   DESIRAE Phillips CNP  [address]  [place]  [place] [address]      "

## 2025-04-21 NOTE — PROGRESS NOTES
Maribel is a 79 year old who is being evaluated via a billable video visit.    What phone number would you like to be contacted at? [phone]  How would you like to obtain your AVS? Mail a copy    Video-Visit Details    Type of service:  Video Visit - changed to Phone only   Video End Time: 1037  Originating Location (pt. Location): Home    Distant Location (provider location):  On-site  Platform used for Video Visit: Unable to complete video visit - phone visit    n/a

## 2025-04-21 NOTE — PATIENT INSTRUCTIONS
Thank you for your visit with the [facility] today.    Medication changes and/or recommendations discussed today:     Take the Torsemide 20mg EVERY day with the potassium chloride. If you miss dose in the morning then take as soon as you can in the afternoon.   Daily weights  Low sodium diet  Follow up call in 1 week. If weight not decreasing we can increase the tosremdie to 40mg but have my nurses guide you as you will need more potassium and lab recheck      Follow up plan:   - 4 weeks with Lalitha foreman for virtual visit if no transportation         If you have questions or concerns in the meantime please call my nurse team at [phone] or send a Pin or Peg message for non urgent requests.       Scheduling phone number: [phone]    ________________________________    DESIRAE Crocker, CNP    [facility]

## 2025-05-05 ENCOUNTER — APPOINTMENT (OUTPATIENT)
Dept: CT IMAGING | Facility: CLINIC | Age: 80
End: 2025-05-05
Attending: EMERGENCY MEDICINE
Payer: MEDICARE

## 2025-05-05 ENCOUNTER — HOSPITAL ENCOUNTER (EMERGENCY)
Facility: CLINIC | Age: 80
Discharge: HOME | End: 2025-05-05
Attending: EMERGENCY MEDICINE | Admitting: EMERGENCY MEDICINE
Payer: MEDICARE

## 2025-05-05 VITALS
SYSTOLIC BLOOD PRESSURE: 134 MMHG | WEIGHT: 208 LBS | BODY MASS INDEX: 38.28 KG/M2 | DIASTOLIC BLOOD PRESSURE: 75 MMHG | RESPIRATION RATE: 21 BRPM | TEMPERATURE: 98.5 F | HEIGHT: 62 IN | OXYGEN SATURATION: 94 % | HEART RATE: 73 BPM

## 2025-05-05 DIAGNOSIS — M54.6 CHRONIC RIGHT-SIDED THORACIC BACK PAIN: ICD-10-CM

## 2025-05-05 DIAGNOSIS — G89.29 CHRONIC RIGHT-SIDED THORACIC BACK PAIN: ICD-10-CM

## 2025-05-05 DIAGNOSIS — R91.1 PULMONARY NODULE: ICD-10-CM

## 2025-05-05 LAB
ALBUMIN SERPL BCG-MCNC: 3.8 G/DL (ref 3.5–5.2)
ALP SERPL-CCNC: 133 U/L (ref 40–150)
ALT SERPL W P-5'-P-CCNC: 13 U/L (ref 0–50)
ANION GAP SERPL CALCULATED.3IONS-SCNC: 13 MMOL/L (ref 7–15)
AST SERPL W P-5'-P-CCNC: 18 U/L (ref 0–45)
ATRIAL RATE - MUSE: 76 BPM
BASOPHILS # BLD AUTO: 0 10E3/UL (ref 0–0.2)
BASOPHILS NFR BLD AUTO: 0 %
BILIRUB DIRECT SERPL-MCNC: 0.23 MG/DL (ref 0–0.3)
BILIRUB SERPL-MCNC: 0.5 MG/DL
BUN SERPL-MCNC: 11.4 MG/DL (ref 8–23)
CALCIUM SERPL-MCNC: 9.8 MG/DL (ref 8.8–10.4)
CHLORIDE SERPL-SCNC: 93 MMOL/L (ref 98–107)
CREAT SERPL-MCNC: 0.74 MG/DL (ref 0.51–0.95)
D DIMER PPP FEU-MCNC: 0.29 UG/ML FEU (ref 0–0.5)
DIASTOLIC BLOOD PRESSURE - MUSE: NORMAL MMHG
EGFRCR SERPLBLD CKD-EPI 2021: 81 ML/MIN/1.73M2
EOSINOPHIL # BLD AUTO: 0.2 10E3/UL (ref 0–0.7)
EOSINOPHIL NFR BLD AUTO: 2 %
ERYTHROCYTE [DISTWIDTH] IN BLOOD BY AUTOMATED COUNT: 13.6 % (ref 10–15)
GLUCOSE SERPL-MCNC: 99 MG/DL (ref 70–99)
HCO3 SERPL-SCNC: 23 MMOL/L (ref 22–29)
HCT VFR BLD AUTO: 39.5 % (ref 35–47)
HGB BLD-MCNC: 13.7 G/DL (ref 11.7–15.7)
IMM GRANULOCYTES # BLD: 0.1 10E3/UL
IMM GRANULOCYTES NFR BLD: 1 %
INTERPRETATION ECG - MUSE: NORMAL
LYMPHOCYTES # BLD AUTO: 1.8 10E3/UL (ref 0.8–5.3)
LYMPHOCYTES NFR BLD AUTO: 17 %
MCH RBC QN AUTO: 30.5 PG (ref 26.5–33)
MCHC RBC AUTO-ENTMCNC: 34.7 G/DL (ref 31.5–36.5)
MCV RBC AUTO: 88 FL (ref 78–100)
MONOCYTES # BLD AUTO: 0.8 10E3/UL (ref 0–1.3)
MONOCYTES NFR BLD AUTO: 8 %
NEUTROPHILS # BLD AUTO: 7.9 10E3/UL (ref 1.6–8.3)
NEUTROPHILS NFR BLD AUTO: 73 %
NRBC # BLD AUTO: 0 10E3/UL
NRBC BLD AUTO-RTO: 0 /100
P AXIS - MUSE: 20 DEGREES
PLATELET # BLD AUTO: 313 10E3/UL (ref 150–450)
POTASSIUM SERPL-SCNC: 3.6 MMOL/L (ref 3.4–5.3)
PR INTERVAL - MUSE: 156 MS
PROT SERPL-MCNC: 7.5 G/DL (ref 6.4–8.3)
QRS DURATION - MUSE: 98 MS
QT - MUSE: 338 MS
QTC - MUSE: 380 MS
R AXIS - MUSE: 41 DEGREES
RBC # BLD AUTO: 4.49 10E6/UL (ref 3.8–5.2)
SODIUM SERPL-SCNC: 129 MMOL/L (ref 135–145)
SYSTOLIC BLOOD PRESSURE - MUSE: NORMAL MMHG
T AXIS - MUSE: 151 DEGREES
TROPONIN T SERPL HS-MCNC: 15 NG/L
TROPONIN T SERPL HS-MCNC: 15 NG/L
VENTRICULAR RATE- MUSE: 76 BPM
WBC # BLD AUTO: 10.7 10E3/UL (ref 4–11)

## 2025-05-05 PROCEDURE — 85025 COMPLETE CBC W/AUTO DIFF WBC: CPT | Performed by: EMERGENCY MEDICINE

## 2025-05-05 PROCEDURE — 93005 ELECTROCARDIOGRAM TRACING: CPT

## 2025-05-05 PROCEDURE — 250N000013 HC RX MED GY IP 250 OP 250 PS 637: Performed by: EMERGENCY MEDICINE

## 2025-05-05 PROCEDURE — 82248 BILIRUBIN DIRECT: CPT | Performed by: EMERGENCY MEDICINE

## 2025-05-05 PROCEDURE — 36415 COLL VENOUS BLD VENIPUNCTURE: CPT | Performed by: EMERGENCY MEDICINE

## 2025-05-05 PROCEDURE — 99285 EMERGENCY DEPT VISIT HI MDM: CPT | Mod: 25

## 2025-05-05 PROCEDURE — 85379 FIBRIN DEGRADATION QUANT: CPT | Performed by: EMERGENCY MEDICINE

## 2025-05-05 PROCEDURE — 96374 THER/PROPH/DIAG INJ IV PUSH: CPT | Mod: 59

## 2025-05-05 PROCEDURE — 84484 ASSAY OF TROPONIN QUANT: CPT | Performed by: EMERGENCY MEDICINE

## 2025-05-05 PROCEDURE — 250N000011 HC RX IP 250 OP 636: Performed by: EMERGENCY MEDICINE

## 2025-05-05 PROCEDURE — 80048 BASIC METABOLIC PNL TOTAL CA: CPT | Performed by: EMERGENCY MEDICINE

## 2025-05-05 PROCEDURE — 250N000009 HC RX 250: Performed by: EMERGENCY MEDICINE

## 2025-05-05 PROCEDURE — 74177 CT ABD & PELVIS W/CONTRAST: CPT

## 2025-05-05 RX ORDER — CYCLOBENZAPRINE HCL 5 MG
5 TABLET ORAL 3 TIMES DAILY PRN
Qty: 15 TABLET | Refills: 0 | Status: SHIPPED | OUTPATIENT
Start: 2025-05-05

## 2025-05-05 RX ORDER — LIDOCAINE 4 G/G
1 PATCH TOPICAL EVERY 12 HOURS PRN
Qty: 10 PATCH | Refills: 0 | Status: SHIPPED | OUTPATIENT
Start: 2025-05-05

## 2025-05-05 RX ORDER — OXYCODONE HYDROCHLORIDE 5 MG/1
5 TABLET ORAL ONCE
Status: COMPLETED | OUTPATIENT
Start: 2025-05-05 | End: 2025-05-05

## 2025-05-05 RX ORDER — IOPAMIDOL 755 MG/ML
72 INJECTION, SOLUTION INTRAVASCULAR ONCE
Status: COMPLETED | OUTPATIENT
Start: 2025-05-05 | End: 2025-05-05

## 2025-05-05 RX ORDER — LIDOCAINE 4 G/G
1 PATCH TOPICAL ONCE
Status: DISCONTINUED | OUTPATIENT
Start: 2025-05-05 | End: 2025-05-05 | Stop reason: HOSPADM

## 2025-05-05 RX ORDER — ACETAMINOPHEN 500 MG
1000 TABLET ORAL ONCE
Status: COMPLETED | OUTPATIENT
Start: 2025-05-05 | End: 2025-05-05

## 2025-05-05 RX ORDER — HYDROMORPHONE HYDROCHLORIDE 1 MG/ML
0.3 INJECTION, SOLUTION INTRAMUSCULAR; INTRAVENOUS; SUBCUTANEOUS
Status: COMPLETED | OUTPATIENT
Start: 2025-05-05 | End: 2025-05-05

## 2025-05-05 RX ADMIN — OXYCODONE HYDROCHLORIDE 5 MG: 5 TABLET ORAL at 02:04

## 2025-05-05 RX ADMIN — IOPAMIDOL 72 ML: 755 INJECTION, SOLUTION INTRAVENOUS at 03:31

## 2025-05-05 RX ADMIN — ACETAMINOPHEN 1000 MG: 500 TABLET ORAL at 02:03

## 2025-05-05 RX ADMIN — HYDROMORPHONE HYDROCHLORIDE 0.3 MG: 1 INJECTION, SOLUTION INTRAMUSCULAR; INTRAVENOUS; SUBCUTANEOUS at 03:21

## 2025-05-05 RX ADMIN — SODIUM CHLORIDE 80 ML: 9 INJECTION, SOLUTION INTRAVENOUS at 03:31

## 2025-05-05 RX ADMIN — LIDOCAINE 1 PATCH: 4 PATCH TOPICAL at 02:04

## 2025-05-05 ASSESSMENT — ACTIVITIES OF DAILY LIVING (ADL)
ADLS_ACUITY_SCORE: 54

## 2025-05-05 ASSESSMENT — COLUMBIA-SUICIDE SEVERITY RATING SCALE - C-SSRS
1. IN THE PAST MONTH, HAVE YOU WISHED YOU WERE DEAD OR WISHED YOU COULD GO TO SLEEP AND NOT WAKE UP?: NO
6. HAVE YOU EVER DONE ANYTHING, STARTED TO DO ANYTHING, OR PREPARED TO DO ANYTHING TO END YOUR LIFE?: NO
2. HAVE YOU ACTUALLY HAD ANY THOUGHTS OF KILLING YOURSELF IN THE PAST MONTH?: NO

## 2025-05-05 NOTE — ED PROVIDER NOTES
"  Emergency Department Note      History of Present Illness     Chief Complaint   Back Pain      HPI   Maribel Buenrostro is a 79 year old female on Eliquis with history of hyperlipidemia, CHF, and hypertension who presents to the ED with back pain.  Patient reports intermittent right sided back pain since easter and tonight when she went to bed her pain was so bad it was very difficult for her to breathe. She reports nausea today as well. Her pain started in the midline but is now more to the right side. She denies chest pain, urinary symptoms, cough or recent illness. She last took tramadol 7 hours ago. She has not taken her Eliquis dose tonight yet.     Independent Historian   None    Review of External Notes   None    Past Medical History     Medical History and Problem List   Hyperlipidemia   Atherosclerosis of coronary artery without angina pectoris   Aortocoronary bypass graft present   CHF   Paroxysmal trial fibrillation   Cox's esophagus   Hypertension   Postoperative heart failure  Adjustment disorder with anxiety   Hypertrophic cardiomyopathy   Irritable bowel syndrome   Osteoporosis     Medications   Eliquis   Amlodipine   Prolia   Pepcid   Lasix   Neurontin   Zestril   Lipitor   Toprol   Prilosec   Demadex     Surgical History   CABG, artery-vein, three    Physical Exam     Patient Vitals for the past 24 hrs:   BP Temp Temp src Pulse Resp SpO2 Height Weight   05/05/25 0500 134/75 -- -- 73 21 98 % -- --   05/05/25 0415 131/75 -- -- 75 15 96 % -- --   05/05/25 0300 (!) 145/80 -- -- 75 19 94 % -- --   05/05/25 0200 (!) 152/74 -- -- 75 10 96 % -- --   05/05/25 0022 (!) 177/84 98.5  F (36.9  C) Tympanic 87 20 97 % 1.575 m (5' 2\") 94.3 kg (208 lb)   05/05/25 0011 (!) 177/84 -- -- 76 20 95 % -- --     Physical Exam  General: Sitting up in bed  Eyes:  The pupils are equal and round    Conjunctivae and sclerae are normal  ENT:    Atraumatic face  Neck:  Normal range of motion  CV:  Regular rate, regular rhythm "     Skin warm and well perfused   Resp:  Non labored breathing on room air    No tachypnea    No cough heard    Lungs clear bilaterally  GI:  Abdomen is soft, there is no rigidity    No distension    No rebound tenderness     No abdominal tenderness  MS:  No midline thoracic or lumbar spine tenderness.  Mild tenderness to palpation on right lateral posterior back-no rash on this area  Skin:  No rash or acute skin lesions noted  Neuro:   Awake, alert.      Speech is normal and fluent.    Face is symmetric.     Moves all extremities equally  Psych: Normal affect.  Appropriate interactions.     Diagnostics     Lab Results   Labs Ordered and Resulted from Time of ED Arrival to Time of ED Departure   BASIC METABOLIC PANEL - Abnormal       Result Value    Sodium 129 (*)     Potassium 3.6      Chloride 93 (*)     Carbon Dioxide (CO2) 23      Anion Gap 13      Urea Nitrogen 11.4      Creatinine 0.74      GFR Estimate 81      Calcium 9.8      Glucose 99     TROPONIN T, HIGH SENSITIVITY - Abnormal    Troponin T, High Sensitivity 15 (*)    TROPONIN T, HIGH SENSITIVITY - Abnormal    Troponin T, High Sensitivity 15 (*)    HEPATIC FUNCTION PANEL - Normal    Protein Total 7.5      Albumin 3.8      Bilirubin Total 0.5      Alkaline Phosphatase 133      AST 18      ALT 13      Bilirubin Direct 0.23     D DIMER QUANTITATIVE - Normal    D-Dimer Quantitative 0.29     CBC WITH PLATELETS AND DIFFERENTIAL    WBC Count 10.7      RBC Count 4.49      Hemoglobin 13.7      Hematocrit 39.5      MCV 88      MCH 30.5      MCHC 34.7      RDW 13.6      Platelet Count 313      % Neutrophils 73      % Lymphocytes 17      % Monocytes 8      % Eosinophils 2      % Basophils 0      % Immature Granulocytes 1      NRBCs per 100 WBC 0      Absolute Neutrophils 7.9      Absolute Lymphocytes 1.8      Absolute Monocytes 0.8      Absolute Eosinophils 0.2      Absolute Basophils 0.0      Absolute Immature Granulocytes 0.1      Absolute NRBCs 0.0       Imaging    CT Aortic Survey w Contrast   Preliminary Result   IMPRESSION:   1.  There is no aortic aneurysm or dissection. No acute abnormality.   2.  Few small indeterminate pulmonary nodules.   3.  Colonic diverticula without acute diverticulitis. No bowel obstruction or inflammation.   4.  Fatty infiltration of the liver.                    EKG   ECG taken at 0020, ECG read at 0020  Normal sinus rhythm  Nonspecific ST and T wave abnormality   No change as compared to prior, dated 10/16/23.  Rate 76 bpm. NY interval 156 ms. QRS duration 98 ms. QT/QTc 338/380 ms. P-R-T axes 20 41 151.    Independent Interpretation   None    ED Course      Medications Administered   Medications   Lidocaine (LIDOCARE) 4 % Patch 1 patch (1 patch Transdermal $Patch/Med Applied 5/5/25 0204)   oxyCODONE (ROXICODONE) tablet 5 mg (5 mg Oral $Given 5/5/25 0204)   acetaminophen (TYLENOL) tablet 1,000 mg (1,000 mg Oral $Given 5/5/25 0203)   HYDROmorphone (PF) (DILAUDID) injection 0.3 mg (0.3 mg Intravenous $Given 5/5/25 0321)   iopamidol (ISOVUE-370) solution 72 mL (72 mLs Intravenous $Given 5/5/25 0331)   sodium chloride 0.9 % bag for CT scan flush (80 mLs Intravenous $Given 5/5/25 0331)     Procedures   None     Discussion of Management   None    ED Course   ED Course as of 05/05/25 0535   Mon May 05, 2025   0124 I obtained history and examined the patient as noted above.    0304 I rechecked the patient and explained findings.        Additional Documentation  None    Medical Decision Making / Diagnosis       ZAKIYA Buenrostro is a 79 year old female presents emergency department with pain.  Patient has had pain on her back for several weeks.  She got concerned when she felt slightly nauseous today so wanted to be evaluated.  EKG shows no acute ischemic changes.  She has no chest pain to suggest ACS.  Troponin very low x 2 and unlikely ACS.  D-dimer is normal and she is on anticoagulation so doubt PE.  No evidence of dissection on imaging.   Imaging shows no other acute cause of her pain.  I do wonder if this is more musculoskeletal pain as she does have tenderness on palpation.  Recommended continue symptomatic treatment at home and follow-up with PCP.    Disposition   The patient was discharged.     Diagnosis     ICD-10-CM    1. Pulmonary nodule  R91.1       2. Chronic right-sided thoracic back pain  M54.6     G89.29          Discharge Medications   New Prescriptions    CYCLOBENZAPRINE (FLEXERIL) 5 MG TABLET    Take 1 tablet (5 mg) by mouth 3 times daily as needed for muscle spasms.    LIDOCAINE (LIDOCARE) 4 % PATCH    Place 1 patch over 12 hours onto the skin every 12 hours as needed for moderate pain. To prevent lidocaine toxicity, patient should be patch free for 12 hrs daily.     Scribe Disclosure:  Derick BARBA, am serving as a scribe at 1:13 AM on 5/5/2025 to document services personally performed by Meena Ward MD based on my observations and the provider's statements to me.        Meena Ward MD  05/05/25 0657

## 2025-05-05 NOTE — DISCHARGE INSTRUCTIONS
You can continue lidocaine patches as needed, take current patch off in 12 hours, then keep off for 12 hours. Can put a new patch on after this  Tylenol for pain as needed  Tramadol as needed  Muscle relaxer as needed  Follow up with primary care provider about the pain

## 2025-05-05 NOTE — ED NOTES
Bed: ED14  Expected date:   Expected time:   Means of arrival:   Comments:  [place] 79F pain in shoulders, dizzy/nausea, VSS eta 0001

## 2025-05-05 NOTE — ED TRIAGE NOTES
[facility]  ED Arrival Note    Pt BIBA from home for increased back pain which has been intermittent for a few weeks.  Pt also states that she is having SOB with the back pain. She has been taking Tramadol for the pain but states she needs something stronger.   Pt has a hx of MI and triple bypass 2 years ago and is on Eliquis. Tonight she also felt nauseous and was afraid it might be heart related.    Visitors during triage: None      Triage Interventions: EKG and IV and labs    Directed to: Main ED    Pronouns: she/her